# Patient Record
Sex: FEMALE | Race: BLACK OR AFRICAN AMERICAN | Employment: FULL TIME | URBAN - METROPOLITAN AREA
[De-identification: names, ages, dates, MRNs, and addresses within clinical notes are randomized per-mention and may not be internally consistent; named-entity substitution may affect disease eponyms.]

---

## 2017-01-03 ENCOUNTER — APPOINTMENT (OUTPATIENT)
Dept: CT IMAGING | Facility: HOSPITAL | Age: 67
DRG: 021 | End: 2017-01-03
Attending: EMERGENCY MEDICINE
Payer: MEDICAID

## 2017-01-03 ENCOUNTER — ANESTHESIA EVENT (OUTPATIENT)
Dept: INTERVENTIONAL RADIOLOGY/VASCULAR | Facility: HOSPITAL | Age: 67
End: 2017-01-03

## 2017-01-03 ENCOUNTER — APPOINTMENT (OUTPATIENT)
Dept: INTERVENTIONAL RADIOLOGY/VASCULAR | Facility: HOSPITAL | Age: 67
DRG: 021 | End: 2017-01-03
Attending: NURSE PRACTITIONER
Payer: MEDICAID

## 2017-01-03 ENCOUNTER — HOSPITAL ENCOUNTER (INPATIENT)
Facility: HOSPITAL | Age: 67
LOS: 16 days | Discharge: HOME | DRG: 021 | End: 2017-01-19
Attending: EMERGENCY MEDICINE | Admitting: INTERNAL MEDICINE
Payer: MEDICAID

## 2017-01-03 ENCOUNTER — APPOINTMENT (OUTPATIENT)
Dept: CT IMAGING | Facility: HOSPITAL | Age: 67
DRG: 021 | End: 2017-01-03
Attending: PHYSICIAN ASSISTANT
Payer: MEDICAID

## 2017-01-03 ENCOUNTER — ANESTHESIA (OUTPATIENT)
Dept: INTERVENTIONAL RADIOLOGY/VASCULAR | Facility: HOSPITAL | Age: 67
End: 2017-01-03

## 2017-01-03 DIAGNOSIS — I10 ESSENTIAL HYPERTENSION: ICD-10-CM

## 2017-01-03 DIAGNOSIS — I67.1 BRAIN ANEURYSM: ICD-10-CM

## 2017-01-03 DIAGNOSIS — I60.9 SAH (SUBARACHNOID HEMORRHAGE) (HCC): Primary | ICD-10-CM

## 2017-01-03 PROBLEM — R40.20 LOC (LOSS OF CONSCIOUSNESS) (HCC): Status: ACTIVE | Noted: 2017-01-03

## 2017-01-03 PROBLEM — Z91.81 AT RISK FOR FALLING: Status: ACTIVE | Noted: 2017-01-03

## 2017-01-03 LAB
ALBUMIN SERPL-MCNC: 3.7 G/DL (ref 3.5–4.8)
ALP LIVER SERPL-CCNC: 69 U/L (ref 50–130)
ALT SERPL-CCNC: 43 U/L (ref 14–54)
ANTIBODY SCREEN: NEGATIVE
APTT PPP: 26.3 SECONDS (ref 25–34)
AST SERPL-CCNC: 37 U/L (ref 15–41)
ATRIAL RATE: 74 BPM
BASOPHILS # BLD AUTO: 0.05 X10(3) UL (ref 0–0.1)
BASOPHILS NFR BLD AUTO: 0.6 %
BILIRUB SERPL-MCNC: 0.4 MG/DL (ref 0.1–2)
BUN BLD-MCNC: 10 MG/DL (ref 8–20)
BUN BLD-MCNC: 12 MG/DL (ref 8–20)
CALCIUM BLD-MCNC: 7.5 MG/DL (ref 8.3–10.3)
CALCIUM BLD-MCNC: 8.1 MG/DL (ref 8.3–10.3)
CHLORIDE: 107 MMOL/L (ref 101–111)
CHLORIDE: 107 MMOL/L (ref 101–111)
CO2: 24 MMOL/L (ref 22–32)
CO2: 25 MMOL/L (ref 22–32)
CREAT BLD-MCNC: 1.05 MG/DL (ref 0.55–1.02)
CREAT BLD-MCNC: 1.08 MG/DL (ref 0.55–1.02)
EOSINOPHIL # BLD AUTO: 0.07 X10(3) UL (ref 0–0.3)
EOSINOPHIL NFR BLD AUTO: 0.9 %
ERYTHROCYTE [DISTWIDTH] IN BLOOD BY AUTOMATED COUNT: 12.2 % (ref 11.5–16)
EST. AVERAGE GLUCOSE BLD GHB EST-MCNC: 114 MG/DL (ref 68–126)
GLUCOSE BLD-MCNC: 130 MG/DL (ref 65–99)
GLUCOSE BLD-MCNC: 142 MG/DL (ref 70–99)
GLUCOSE BLD-MCNC: 146 MG/DL (ref 70–99)
HAV IGM SER QL: 1.6 MG/DL (ref 1.7–3)
HBA1C MFR BLD HPLC: 5.6 % (ref ?–5.7)
HCT VFR BLD AUTO: 34.9 % (ref 34–50)
HGB BLD-MCNC: 11.5 G/DL (ref 12–16)
IMMATURE GRANULOCYTE COUNT: 0.07 X10(3) UL (ref 0–1)
IMMATURE GRANULOCYTE RATIO %: 0.9 %
INR BLD: 1.05 (ref 0.89–1.12)
ISTAT ACTIVATED CLOTTING TIME: 131 SECONDS (ref 74–137)
ISTAT ACTIVATED CLOTTING TIME: 178 SECONDS (ref 74–137)
LYMPHOCYTES # BLD AUTO: 3.99 X10(3) UL (ref 0.9–4)
LYMPHOCYTES NFR BLD AUTO: 48.5 %
M PROTEIN MFR SERPL ELPH: 7.2 G/DL (ref 6.1–8.3)
MCH RBC QN AUTO: 32.2 PG (ref 27–33.2)
MCHC RBC AUTO-ENTMCNC: 33 G/DL (ref 31–37)
MCV RBC AUTO: 97.8 FL (ref 81–100)
MONOCYTES # BLD AUTO: 0.73 X10(3) UL (ref 0.1–0.6)
MONOCYTES NFR BLD AUTO: 8.9 %
NEUTROPHIL ABS PRELIM: 3.32 X10 (3) UL (ref 1.3–6.7)
NEUTROPHILS # BLD AUTO: 3.32 X10(3) UL (ref 1.3–6.7)
NEUTROPHILS NFR BLD AUTO: 40.2 %
P AXIS: 73 DEGREES
P-R INTERVAL: 144 MS
PLATELET # BLD AUTO: 192 10(3)UL (ref 150–450)
POTASSIUM SERPL-SCNC: 2.9 MMOL/L (ref 3.6–5.1)
POTASSIUM SERPL-SCNC: 4.4 MMOL/L (ref 3.6–5.1)
PSA SERPL DL<=0.01 NG/ML-MCNC: 14 SECONDS (ref 12.3–14.8)
Q-T INTERVAL: 432 MS
QRS DURATION: 84 MS
QTC CALCULATION (BEZET): 479 MS
R AXIS: 40 DEGREES
RBC # BLD AUTO: 3.57 X10(6)UL (ref 3.8–5.1)
RED CELL DISTRIBUTION WIDTH-SD: 44.1 FL (ref 35.1–46.3)
RH BLOOD TYPE: POSITIVE
SODIUM SERPL-SCNC: 139 MMOL/L (ref 136–144)
SODIUM SERPL-SCNC: 140 MMOL/L (ref 136–144)
T AXIS: 7 DEGREES
TROPONIN: <0.046 NG/ML (ref ?–0.05)
VENTRICULAR RATE: 74 BPM
WBC # BLD AUTO: 8.2 X10(3) UL (ref 4–13)

## 2017-01-03 PROCEDURE — 70450 CT HEAD/BRAIN W/O DYE: CPT

## 2017-01-03 PROCEDURE — 99291 CRITICAL CARE FIRST HOUR: CPT | Performed by: OTHER

## 2017-01-03 PROCEDURE — B31QYZZ FLUOROSCOPY OF CERVICO-CEREBRAL ARCH USING OTHER CONTRAST: ICD-10-PCS | Performed by: RADIOLOGY

## 2017-01-03 PROCEDURE — 70498 CT ANGIOGRAPHY NECK: CPT

## 2017-01-03 PROCEDURE — 70496 CT ANGIOGRAPHY HEAD: CPT

## 2017-01-03 PROCEDURE — 99223 1ST HOSP IP/OBS HIGH 75: CPT | Performed by: INTERNAL MEDICINE

## 2017-01-03 PROCEDURE — 03VG3BZ RESTRICTION OF INTRACRANIAL ARTERY WITH BIOACTIVE INTRALUMINAL DEVICE, PERCUTANEOUS APPROACH: ICD-10-PCS | Performed by: RADIOLOGY

## 2017-01-03 RX ORDER — HYDROMORPHONE HYDROCHLORIDE 1 MG/ML
0.4 INJECTION, SOLUTION INTRAMUSCULAR; INTRAVENOUS; SUBCUTANEOUS EVERY 5 MIN PRN
Status: ACTIVE | OUTPATIENT
Start: 2017-01-03 | End: 2017-01-04

## 2017-01-03 RX ORDER — FAMOTIDINE 10 MG/ML
20 INJECTION, SOLUTION INTRAVENOUS DAILY
Status: DISCONTINUED | OUTPATIENT
Start: 2017-01-03 | End: 2017-01-05

## 2017-01-03 RX ORDER — SODIUM CHLORIDE 9 MG/ML
INJECTION, SOLUTION INTRAVENOUS CONTINUOUS
Status: ACTIVE | OUTPATIENT
Start: 2017-01-03 | End: 2017-01-03

## 2017-01-03 RX ORDER — MORPHINE SULFATE 4 MG/ML
4 INJECTION, SOLUTION INTRAMUSCULAR; INTRAVENOUS EVERY 2 HOUR PRN
Status: DISCONTINUED | OUTPATIENT
Start: 2017-01-03 | End: 2017-01-03

## 2017-01-03 RX ORDER — LIDOCAINE HYDROCHLORIDE 10 MG/ML
INJECTION, SOLUTION INFILTRATION; PERINEURAL
Status: COMPLETED
Start: 2017-01-03 | End: 2017-01-03

## 2017-01-03 RX ORDER — MORPHINE SULFATE 2 MG/ML
2 INJECTION, SOLUTION INTRAMUSCULAR; INTRAVENOUS EVERY 2 HOUR PRN
Status: DISCONTINUED | OUTPATIENT
Start: 2017-01-03 | End: 2017-01-19

## 2017-01-03 RX ORDER — MORPHINE SULFATE 2 MG/ML
2 INJECTION, SOLUTION INTRAMUSCULAR; INTRAVENOUS EVERY 2 HOUR PRN
Status: DISCONTINUED | OUTPATIENT
Start: 2017-01-03 | End: 2017-01-03

## 2017-01-03 RX ORDER — ONDANSETRON 2 MG/ML
4 INJECTION INTRAMUSCULAR; INTRAVENOUS AS NEEDED
Status: DISCONTINUED | OUTPATIENT
Start: 2017-01-03 | End: 2017-01-03

## 2017-01-03 RX ORDER — SODIUM CHLORIDE, SODIUM LACTATE, POTASSIUM CHLORIDE, CALCIUM CHLORIDE 600; 310; 30; 20 MG/100ML; MG/100ML; MG/100ML; MG/100ML
INJECTION, SOLUTION INTRAVENOUS CONTINUOUS
Status: DISCONTINUED | OUTPATIENT
Start: 2017-01-03 | End: 2017-01-05

## 2017-01-03 RX ORDER — MORPHINE SULFATE 4 MG/ML
4 INJECTION, SOLUTION INTRAMUSCULAR; INTRAVENOUS EVERY 2 HOUR PRN
Status: DISCONTINUED | OUTPATIENT
Start: 2017-01-03 | End: 2017-01-05

## 2017-01-03 RX ORDER — FAMOTIDINE 20 MG/1
20 TABLET ORAL DAILY
Status: DISCONTINUED | OUTPATIENT
Start: 2017-01-03 | End: 2017-01-19

## 2017-01-03 RX ORDER — LABETALOL HYDROCHLORIDE 5 MG/ML
10 INJECTION, SOLUTION INTRAVENOUS AS NEEDED
Status: DISCONTINUED | OUTPATIENT
Start: 2017-01-03 | End: 2017-01-19

## 2017-01-03 RX ORDER — ONDANSETRON 2 MG/ML
4 INJECTION INTRAMUSCULAR; INTRAVENOUS EVERY 6 HOURS PRN
Status: DISCONTINUED | OUTPATIENT
Start: 2017-01-03 | End: 2017-01-19

## 2017-01-03 RX ORDER — POLYETHYLENE GLYCOL 3350 17 G/17G
17 POWDER, FOR SOLUTION ORAL DAILY PRN
Status: DISCONTINUED | OUTPATIENT
Start: 2017-01-03 | End: 2017-01-19

## 2017-01-03 RX ORDER — ONDANSETRON 2 MG/ML
4 INJECTION INTRAMUSCULAR; INTRAVENOUS ONCE
Status: COMPLETED | OUTPATIENT
Start: 2017-01-03 | End: 2017-01-03

## 2017-01-03 RX ORDER — MEPERIDINE HYDROCHLORIDE 25 MG/ML
12.5 INJECTION INTRAMUSCULAR; INTRAVENOUS; SUBCUTANEOUS AS NEEDED
Status: DISCONTINUED | OUTPATIENT
Start: 2017-01-03 | End: 2017-01-05

## 2017-01-03 RX ORDER — FAMOTIDINE 10 MG/ML
20 INJECTION, SOLUTION INTRAVENOUS DAILY
Status: DISCONTINUED | OUTPATIENT
Start: 2017-01-03 | End: 2017-01-03

## 2017-01-03 RX ORDER — NIMODIPINE 30 MG/1
60 CAPSULE, LIQUID FILLED ORAL
Status: DISCONTINUED | OUTPATIENT
Start: 2017-01-03 | End: 2017-01-11

## 2017-01-03 RX ORDER — NALOXONE HYDROCHLORIDE 0.4 MG/ML
80 INJECTION, SOLUTION INTRAMUSCULAR; INTRAVENOUS; SUBCUTANEOUS AS NEEDED
Status: ACTIVE | OUTPATIENT
Start: 2017-01-03 | End: 2017-01-04

## 2017-01-03 RX ORDER — HEPARIN SODIUM 5000 [USP'U]/ML
INJECTION, SOLUTION INTRAVENOUS; SUBCUTANEOUS
Status: COMPLETED
Start: 2017-01-03 | End: 2017-01-03

## 2017-01-03 RX ORDER — FAMOTIDINE 10 MG/ML
INJECTION, SOLUTION INTRAVENOUS
Status: DISPENSED
Start: 2017-01-03 | End: 2017-01-04

## 2017-01-03 RX ORDER — ONDANSETRON 2 MG/ML
4 INJECTION INTRAMUSCULAR; INTRAVENOUS EVERY 4 HOURS PRN
Status: DISCONTINUED | OUTPATIENT
Start: 2017-01-03 | End: 2017-01-03

## 2017-01-03 RX ORDER — FAMOTIDINE 20 MG/1
20 TABLET ORAL DAILY
Status: DISCONTINUED | OUTPATIENT
Start: 2017-01-03 | End: 2017-01-03

## 2017-01-03 RX ORDER — CEFAZOLIN SODIUM 1 G/3ML
INJECTION, POWDER, FOR SOLUTION INTRAMUSCULAR; INTRAVENOUS
Status: DISCONTINUED | OUTPATIENT
Start: 2017-01-03 | End: 2017-01-04

## 2017-01-03 RX ORDER — MORPHINE SULFATE 4 MG/ML
4 INJECTION, SOLUTION INTRAMUSCULAR; INTRAVENOUS ONCE
Status: COMPLETED | OUTPATIENT
Start: 2017-01-03 | End: 2017-01-03

## 2017-01-03 RX ORDER — SODIUM CHLORIDE 9 MG/ML
INJECTION, SOLUTION INTRAVENOUS CONTINUOUS
Status: DISCONTINUED | OUTPATIENT
Start: 2017-01-03 | End: 2017-01-07

## 2017-01-03 RX ORDER — ONDANSETRON 2 MG/ML
INJECTION INTRAMUSCULAR; INTRAVENOUS
Status: COMPLETED
Start: 2017-01-03 | End: 2017-01-03

## 2017-01-03 RX ORDER — BISACODYL 10 MG
10 SUPPOSITORY, RECTAL RECTAL
Status: DISCONTINUED | OUTPATIENT
Start: 2017-01-03 | End: 2017-01-19

## 2017-01-03 RX ORDER — VERAPAMIL HYDROCHLORIDE 2.5 MG/ML
INJECTION, SOLUTION INTRAVENOUS
Status: COMPLETED
Start: 2017-01-03 | End: 2017-01-03

## 2017-01-03 RX ADMIN — CEFAZOLIN SODIUM 2 G: 1 INJECTION, POWDER, FOR SOLUTION INTRAMUSCULAR; INTRAVENOUS at 17:31:00

## 2017-01-03 NOTE — PROGRESS NOTES
[id number] Ell[facility] Strong Interventional Neuro Radiology Consult    Wayne Dupont Patient Status:  Emergency    1950 MRN [id number]   Location 656 [facility] Attending Jeromy Carreon MD   Hosp Day # 0 PCP PHYSICIAN NONSTAFF Ht 5' 4\" (1.626 m)  Wt 140 lb (63.504 kg)  BMI 24.02 kg/m2  SpO2 100%  GENERAL:  Patient is a 61year old female in no acute distress. HEENT:  Normocephalic, atraumatic  LUNGS: Clear to auscultation bilaterally. HEART:  S1, S2, Regular rate and rhythm. parotid neoplasm. This should be further characterized with dedicated MRI of the soft tissues of the neck with and without contrast.    1/3/2017 CT Brain  Diffuse SAH throughout the basal cisterns in the supratentorial sulci is noted.   Focal collection of

## 2017-01-03 NOTE — ANESTHESIA PREPROCEDURE EVALUATION
PRE-OP EVALUATION    Patient Name: Asim Gamboa    Pre-op Diagnosis: SAH    Cerebral Aneurysm coiling    Pre-op vitals reviewed. Temp: 97.8 °F (36.6 °C)  Pulse: 93  Resp: 14  BP: 139/80 mmHg  SpO2: 100 %    Current medications reviewed.   [facility] reviewed. No pertinent past surgical history. Smoking status: Former Smoker     Smokeless tobacco: Not on file    Alcohol Use: Not on file       Drug Use: Not on file     Available pre-op labs reviewed.     Lab Results  Component Value Date   WBC 8.2 01

## 2017-01-03 NOTE — CONSULTS
[facility]  Neurocritical Care       Name: Lisa Ortega  MRN: [id number]  Admission Date/Time: 1/3/2017  2:21 PM  Primary Care Provider:  PHYSICIAN NONSTAFF        Reason for Consultation:   SAH - Massive Headache, worst headache of life. production  Cardiac:               Denies chest pain, palpitations or lower extremity edema. GI:                         Denies constipation, heartburn or melena. :                       Denies dysuria or hematuria.   Skin:                     Denies ra ideally near 70 and HDL > or around 40.  4. Seizure prophylaxis - Keppra 1000mg IV x 1 then 500mg IV BID and EEG. 5. Hold all antiplatelets and anticoagulants as of now. 6. Repeat CT Brain in am or sooner if patient deteriorates.   7. IV Fluids 0.9 bernabe

## 2017-01-03 NOTE — ED INITIAL ASSESSMENT (HPI)
Pt was at home talking to her friends sitting in the living room and fell out of the chair passed out for about 30 sec and woke up with a headache

## 2017-01-03 NOTE — ED PROVIDER NOTES
Patient Seen in: [facility] Emergency Department    History   Patient presents with:  Syncope (cardiovascular, neurologic)    Stated Complaint: syncope    HPI    62-year-old female presents to the emergency department by ambulance after experiencing signs were reviewed per nurse's notes. Initial blood pressure was 158/92. HEENT: Normocephalic atraumatic. Pupils equal round reactive to light. Extra ocular movements are intact.   Nose and oropharynx are normal.  Faces are symmetrical.  Neck: No adeno ---------                               -----------         ------                     BLOOD TYPE, ABO AND RH [id number]]                         Final result               ANTIBODY CLMBAV[[id number]]                                  Final result thyroid ultrasound.     5. 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  This should be further characterized with dedicated MRI of the soft tissues of the neck wi

## 2017-01-03 NOTE — H&P
[facility] HOSPITALIST  History and Physical     Daisyabraham Gonzalez Patient Status:  Emergency    1950 MRN [id number]   Location 656 [facility] Attending Viv Hackett MD   Hosp Day # 0 PCP PHYSICIAN NONSTAFF     Chief Complaint: distress. Somnolent but arouseable. HEENT: Normocephalic atraumatic. Moist mucous membranes. EOM-I. PERRLA. Anicteric. Neck: No lymphadenopathy. JVD approx 4cm above clavicle HOB at 30 degree. No carotid bruits.   Respiratory: Clear to auscultation bilate bleed  · CODE status: full  · Wayne: present, remove ASAP    Plan of care discussed with patient    Deni Woodruff MD  1/3/2017

## 2017-01-04 ENCOUNTER — APPOINTMENT (OUTPATIENT)
Dept: ULTRASOUND IMAGING | Facility: HOSPITAL | Age: 67
DRG: 021 | End: 2017-01-04
Attending: Other
Payer: MEDICAID

## 2017-01-04 ENCOUNTER — APPOINTMENT (OUTPATIENT)
Dept: CT IMAGING | Facility: HOSPITAL | Age: 67
DRG: 021 | End: 2017-01-04
Attending: Other
Payer: MEDICAID

## 2017-01-04 ENCOUNTER — APPOINTMENT (OUTPATIENT)
Dept: CV DIAGNOSTICS | Facility: HOSPITAL | Age: 67
DRG: 021 | End: 2017-01-04
Attending: Other
Payer: MEDICAID

## 2017-01-04 LAB
ALBUMIN SERPL-MCNC: 3.5 G/DL (ref 3.5–4.8)
ALP LIVER SERPL-CCNC: 67 U/L (ref 50–130)
ALT SERPL-CCNC: 41 U/L (ref 14–54)
AST SERPL-CCNC: 39 U/L (ref 15–41)
BASOPHILS # BLD AUTO: 0.02 X10(3) UL (ref 0–0.1)
BASOPHILS NFR BLD AUTO: 0.2 %
BILIRUB SERPL-MCNC: 0.5 MG/DL (ref 0.1–2)
BUN BLD-MCNC: 8 MG/DL (ref 8–20)
CALCIUM BLD-MCNC: 8.1 MG/DL (ref 8.3–10.3)
CHLORIDE: 104 MMOL/L (ref 101–111)
CHOLEST SMN-MCNC: 194 MG/DL (ref ?–200)
CO2: 25 MMOL/L (ref 22–32)
CREAT BLD-MCNC: 1.12 MG/DL (ref 0.55–1.02)
EOSINOPHIL # BLD AUTO: 0 X10(3) UL (ref 0–0.3)
EOSINOPHIL NFR BLD AUTO: 0 %
ERYTHROCYTE [DISTWIDTH] IN BLOOD BY AUTOMATED COUNT: 12.4 % (ref 11.5–16)
GLUCOSE BLD-MCNC: 115 MG/DL (ref 65–99)
GLUCOSE BLD-MCNC: 123 MG/DL (ref 65–99)
GLUCOSE BLD-MCNC: 124 MG/DL (ref 65–99)
GLUCOSE BLD-MCNC: 139 MG/DL (ref 65–99)
GLUCOSE BLD-MCNC: 148 MG/DL (ref 65–99)
GLUCOSE BLD-MCNC: 160 MG/DL (ref 70–99)
GLUCOSE BLD-MCNC: 273 MG/DL (ref 65–99)
HAV IGM SER QL: 2.4 MG/DL (ref 1.7–3)
HCT VFR BLD AUTO: 36.6 % (ref 34–50)
HDLC SERPL-MCNC: 101 MG/DL (ref 45–?)
HDLC SERPL: 1.92 {RATIO} (ref ?–4.44)
HGB BLD-MCNC: 12.4 G/DL (ref 12–16)
IMMATURE GRANULOCYTE COUNT: 0.04 X10(3) UL (ref 0–1)
IMMATURE GRANULOCYTE RATIO %: 0.4 %
INR BLD: 1.08 (ref 0.89–1.12)
LDLC SERPL CALC-MCNC: 83 MG/DL (ref ?–130)
LYMPHOCYTES # BLD AUTO: 1.09 X10(3) UL (ref 0.9–4)
LYMPHOCYTES NFR BLD AUTO: 10.9 %
M PROTEIN MFR SERPL ELPH: 7.2 G/DL (ref 6.1–8.3)
MCH RBC QN AUTO: 32.3 PG (ref 27–33.2)
MCHC RBC AUTO-ENTMCNC: 33.9 G/DL (ref 31–37)
MCV RBC AUTO: 95.3 FL (ref 81–100)
MONOCYTES # BLD AUTO: 0.38 X10(3) UL (ref 0.1–0.6)
MONOCYTES NFR BLD AUTO: 3.8 %
NEUTROPHIL ABS PRELIM: 8.48 X10 (3) UL (ref 1.3–6.7)
NEUTROPHILS # BLD AUTO: 8.48 X10(3) UL (ref 1.3–6.7)
NEUTROPHILS NFR BLD AUTO: 84.7 %
NONHDLC SERPL-MCNC: 93 MG/DL (ref ?–130)
PLATELET # BLD AUTO: 199 10(3)UL (ref 150–450)
POTASSIUM SERPL-SCNC: 4.4 MMOL/L (ref 3.6–5.1)
PSA SERPL DL<=0.01 NG/ML-MCNC: 14.3 SECONDS (ref 12.3–14.8)
RBC # BLD AUTO: 3.84 X10(6)UL (ref 3.8–5.1)
RED CELL DISTRIBUTION WIDTH-SD: 43.4 FL (ref 35.1–46.3)
SODIUM SERPL-SCNC: 139 MMOL/L (ref 136–144)
TRIGLYCERIDES: 51 MG/DL (ref ?–150)
TROPONIN: <0.046 NG/ML (ref ?–0.05)
VLDL: 10 MG/DL (ref 5–40)
WBC # BLD AUTO: 10 X10(3) UL (ref 4–13)

## 2017-01-04 PROCEDURE — 70450 CT HEAD/BRAIN W/O DYE: CPT

## 2017-01-04 PROCEDURE — 93306 TTE W/DOPPLER COMPLETE: CPT | Performed by: INTERNAL MEDICINE

## 2017-01-04 PROCEDURE — 93886 INTRACRANIAL COMPLETE STUDY: CPT

## 2017-01-04 PROCEDURE — 93306 TTE W/DOPPLER COMPLETE: CPT

## 2017-01-04 PROCEDURE — 99291 CRITICAL CARE FIRST HOUR: CPT | Performed by: OTHER

## 2017-01-04 PROCEDURE — 99232 SBSQ HOSP IP/OBS MODERATE 35: CPT | Performed by: NURSE PRACTITIONER

## 2017-01-04 PROCEDURE — 99233 SBSQ HOSP IP/OBS HIGH 50: CPT | Performed by: INTERNAL MEDICINE

## 2017-01-04 RX ORDER — HYDROCODONE BITARTRATE AND ACETAMINOPHEN 5; 325 MG/1; MG/1
1 TABLET ORAL EVERY 4 HOURS PRN
Status: DISCONTINUED | OUTPATIENT
Start: 2017-01-04 | End: 2017-01-07

## 2017-01-04 RX ORDER — HYDROCHLOROTHIAZIDE 12.5 MG/1
12.5 CAPSULE, GELATIN COATED ORAL EVERY MORNING
Status: ON HOLD | COMMUNITY
End: 2017-01-16

## 2017-01-04 RX ORDER — HYDROCODONE BITARTRATE AND ACETAMINOPHEN 5; 325 MG/1; MG/1
2 TABLET ORAL EVERY 4 HOURS PRN
Status: DISCONTINUED | OUTPATIENT
Start: 2017-01-04 | End: 2017-01-07

## 2017-01-04 RX ORDER — ASPIRIN 81 MG/1
81 TABLET ORAL DAILY
Status: DISCONTINUED | OUTPATIENT
Start: 2017-01-04 | End: 2017-01-19

## 2017-01-04 RX ORDER — HEPARIN SODIUM 5000 [USP'U]/ML
5000 INJECTION, SOLUTION INTRAVENOUS; SUBCUTANEOUS EVERY 12 HOURS
Status: DISCONTINUED | OUTPATIENT
Start: 2017-01-05 | End: 2017-01-09

## 2017-01-04 RX ORDER — ASPIRIN 325 MG
325 TABLET ORAL DAILY
Status: DISCONTINUED | OUTPATIENT
Start: 2017-01-04 | End: 2017-01-04

## 2017-01-04 RX ORDER — ACETAMINOPHEN 325 MG/1
650 TABLET ORAL EVERY 6 HOURS PRN
Status: DISCONTINUED | OUTPATIENT
Start: 2017-01-04 | End: 2017-01-19

## 2017-01-04 NOTE — PROGRESS NOTES
[facility]  Interventional Neuroradiology Progress Note    Trevor Thao Patient Status:  Inpatient    1950 MRN [id number]   [facility] 6NE-A Attending Dionte Stiles MD   Hosp Day # 1 PCP PHYSICIAN NONSTAFF         Subjective: 1.12 01/04/2017   BUN 8 01/04/2017    01/04/2017   K 4.4 01/04/2017    01/04/2017   CO2 25.0 01/04/2017    01/04/2017   CA 8.1 01/04/2017   ALB 3.5 01/04/2017   ALKPHO 67 01/04/2017   BILT 0.5 01/04/2017   TP 7.2 01/04/2017   AST 39 01/0 20 mg Oral Daily   • levETIRAcetam (KEPPRA) IVPB  500 mg Intravenous Q12H   • famoTIDine  20 mg Intravenous Daily   • insulin aspart  1-5 Units Subcutaneous TID CC and HS   • nimodipine  60 mg Oral 6 times per day       Assessment/Plan:    1.  ANNETTA (1/3/17)

## 2017-01-04 NOTE — PAYOR COMM NOTE
Attending Physician: Ted Yuan MD    Review Type: ADMISSION   Reviewer: Catalino Crane       Date: January 4, 2017 - 9:39 AM  Payor: Sergio Barrientos  Authorization Number: N/A  Admit date: 1/3/2017  2:21 PM   Admitted from Emergency Dept.: yes    Desiree Carrera is likely the source of acute subarachnoid hemorrhage.   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     4. Heterogenous thyroid gland would be better evaluated with dedicated thyroid ultrasound.     5. Enhancing lesion in Route User    1/4/2017 0547 Given 2 mg Intravenous Saba Cervantes RN    1/3/2017 2038 Given 2 mg Intravenous Saba Cervantes RN      morphINE sulfate (PF) 4 MG/ML injection 4 mg     Date Action Dose Route User    1/3/2017 1604 Given 4 mg Intravenous M limits   COMP METABOLIC PANEL (14) - Abnormal; Notable for the following:     Glucose 160 (*)     Creatinine 1.12 (*)     Calcium, Total 8.1 (*)     All other components within normal limits   POCT ISTAT ACTIVATED CLOTTING TIME - Abnormal; Notable for the the individual orders. CBC WITH DIFFERENTIAL WITH PLATELET    Narrative: The following orders were created for panel order CBC WITH DIFFERENTIAL WITH PLATELET.   Procedure                               Abnormality         Status                     -- Give Labetelol 10mg IV X 1 if required. 3. Continue Statin, goal Keep LDL < 100 ideally near 70 and HDL > or around 40.  4. Seizure prophylaxis - Keppra 1000mg IV x 1 then 500mg IV BID and EEG.   5. Hold all antiplatelets and anticoagulants as of now.    6

## 2017-01-04 NOTE — PLAN OF CARE
Problem: NEUROLOGICAL - ADULT  Goal: Achieves stable or improved neurological status  INTERVENTIONS  - Assess for and report changes in neurological status  - Initiate measures to prevent increased intracranial pressure  - Maintain blood pressure and fluid Cardene gtt on and off through the night to keep ABP/SBP less than 140. Repeat CT head done as ordered post procedure. Results relayed to Dr. Shant Fagan with no orders made. Made comfortable. Will continue to monitor closely.

## 2017-01-04 NOTE — ANESTHESIA POSTPROCEDURE EVALUATION
Rekha 174 Patient Status:  Emergency   Age/Gender 61year old female MRN [id number]   Location 656 [facility] Attending [address] Day # 0 PCP PHYSICIAN NONSTAFF       Anesthesia Post-op Note

## 2017-01-04 NOTE — OCCUPATIONAL THERAPY NOTE
OT order received. Attempted to see pt for eval, but pt is still on bedrest at this time. Will hold off until upgraded activity orders are present. RN in agreement with this plan.

## 2017-01-04 NOTE — PHYSICAL THERAPY NOTE
Order received for PT eval. Attempted to see Pt this am, however, Pt currently on bedrest and not appropriate for therapy at this time. Will follow up later, as schedule permits.      Followed up later this morning and RN reports patient on bedrest for jose luis

## 2017-01-04 NOTE — PROCEDURES
[facility]  Neurointerventional Surgery Operative Report  Stephanie Patterson Patient Status:  Emergency    1950 MRN [id number]   Location 656 [facility] Attending Ana Laura Valentino MD   Hosp Day # 0 PCP PHYSICIAN NONSTAFF

## 2017-01-04 NOTE — PROGRESS NOTES
Neurosurgery Progress Note     SUBJECTIVE:  Interval History: stable.      OBJECTIVE:  Vital signs   Temp:  [97.8 °F (36.6 °C)-99.4 °F (37.4 °C)] 99.4 °F (37.4 °C)  Pulse:  [71-96] 71  Resp:  [10-22] 12  BP: (101-165)/(61-98) 115/73 mmHg  Arterial Line BP:

## 2017-01-04 NOTE — PROGRESS NOTES
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     Review of Systems    Constitutional:    Denies b/l No pronator drift or tremor. Sensory: Normal and symmetric to light touch.    Cerebellar: Finger-nose-finger intact b/l.   Gait: Deferred          Diagnostics:   Ct Brain Or Head (71773)    1/4/2017  PROCEDURE:  CT OF THE BRAIN WITHOUT CONTRAST  HUNG region of hemorrhage in the left frontal high convexity sulcus. This measures approximately 1.3 x 1.0 cm. .  Low density throughout the white matter is noted. This likely is due to chronic small vessel ischemic disease.  However, possibility of infarcts carmencita Neck (w Iv)(cpt=70496/52101)    1/3/2017  PROCEDURE:  CT STROKE BRAIN (NO IV) + CTA BRAIN/CTA NECK (W IV)(CPT=70496/99496)  COMPARISON:  [facility] , CT BRAIN OR HEAD (28161), 1/03/2017, 14:45.   INDICATIONS:  syncope  TECHNIQUE  Noncontrast CT scanning is perf carotid arteries are otherwise unremarkable. An anterior communicating artery is seen. The branches of the anterior cerebral and middle cerebral arteries are unremarkable.   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  This should be further characterized with dedicated MRI of the soft tissues of the neck with and without contrast.  Critical re norepinephrine (LEVOPHED) 4 mg/250 ml premix infusion 0.5-30 mcg/min Intravenous Continuous PRN   PEG 3350 (MIRALAX) powder packet 17 g 17 g Oral Daily PRN   bisacodyl (DULCOLAX) rectal suppository 10 mg 10 mg Rectal Daily PRN   ondansetron HCl (ZOFRAN) discussions with the patient, family, and clinical staff. Thank you for allowing me to participate in the care of this patient.      Jennifer Greene MD  Medical Director - [phone] [facility]  Board Certified in Neurology, V

## 2017-01-04 NOTE — PLAN OF CARE
Assumed patient care this am. Patient alert, oriented x4. Neurologically intact. See neuro flowsheet for details. Patient c/o headache. Pain meds given as ordered. Zofran given prn nausea, no emesis. Will continue to monitor closely.

## 2017-01-04 NOTE — PROCEDURES
[facility]  Pre-Procedure Note  Lisa Clap Patient Status:  Emergency    1950 MRN [id number]   Location 656 [facility] Attending Peggy Rueda MD   Hosp Day # 0 PCP PHYSICIAN NONSTAFF     Pre-Op Diagnosis:  Aneurys

## 2017-01-04 NOTE — PROGRESS NOTES
[facility]PITAL[facility]  Progress Note     Oralia Ridley Patient Status:  Inpatient    1950 MRN [id number]   [facility] 6NE-A Attending Thong Millan MD   [address] Day # 1 PCP PHYSICIAN NONSTAFF     Chief Complaint: syncope    S: Patient unde reviewed in Epic.     Medications:   • famoTIDine  20 mg Oral Daily   • levETIRAcetam (KEPPRA) IVPB  500 mg Intravenous Q12H   • famoTIDine  20 mg Intravenous Daily   • insulin aspart  1-5 Units Subcutaneous TID CC and HS   • nimodipine  60 mg Oral 6 times

## 2017-01-04 NOTE — CONSULTS
[facility]    PATIENT'S NAME: [email or web address]   ATTENDING PHYSICIAN: Jose Enrique Garcia MD   CONSULTING PHYSICIAN: Jae Rob M.D.    PATIENT ACCOUNT#:   [de-identified]    LOCATION:  [facility] [id number] [facility]  MEDICAL RECORD #:   [id number]       DATE OF BIRTH:  [birth year]

## 2017-01-05 ENCOUNTER — APPOINTMENT (OUTPATIENT)
Dept: ULTRASOUND IMAGING | Facility: HOSPITAL | Age: 67
DRG: 021 | End: 2017-01-05
Attending: Other
Payer: MEDICAID

## 2017-01-05 LAB
BUN BLD-MCNC: 12 MG/DL (ref 8–20)
CALCIUM BLD-MCNC: 8.8 MG/DL (ref 8.3–10.3)
CHLORIDE: 103 MMOL/L (ref 101–111)
CO2: 26 MMOL/L (ref 22–32)
CREAT BLD-MCNC: 0.84 MG/DL (ref 0.55–1.02)
GLUCOSE BLD-MCNC: 111 MG/DL (ref 65–99)
GLUCOSE BLD-MCNC: 113 MG/DL (ref 65–99)
GLUCOSE BLD-MCNC: 115 MG/DL (ref 70–99)
GLUCOSE BLD-MCNC: 117 MG/DL (ref 65–99)
GLUCOSE BLD-MCNC: 159 MG/DL (ref 65–99)
HAV IGM SER QL: 2 MG/DL (ref 1.7–3)
POTASSIUM SERPL-SCNC: 4.2 MMOL/L (ref 3.6–5.1)
SODIUM SERPL-SCNC: 135 MMOL/L (ref 136–144)

## 2017-01-05 PROCEDURE — 99291 CRITICAL CARE FIRST HOUR: CPT | Performed by: OTHER

## 2017-01-05 PROCEDURE — 99233 SBSQ HOSP IP/OBS HIGH 50: CPT | Performed by: INTERNAL MEDICINE

## 2017-01-05 PROCEDURE — 93886 INTRACRANIAL COMPLETE STUDY: CPT

## 2017-01-05 PROCEDURE — 99232 SBSQ HOSP IP/OBS MODERATE 35: CPT | Performed by: NURSE PRACTITIONER

## 2017-01-05 RX ORDER — LEVETIRACETAM 500 MG/1
500 TABLET ORAL 2 TIMES DAILY
Status: DISCONTINUED | OUTPATIENT
Start: 2017-01-05 | End: 2017-01-09

## 2017-01-05 NOTE — PROGRESS NOTES
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 1/5/2017- Headaches still there.     Review of Sy Motor: Tone normal and symmetric. 5/5 b/l No pronator drift or tremor. Sensory: Normal and symmetric to light touch.    Cerebellar: Finger-nose-finger intact b/l.   Gait: Deferred          Diagnostics:   Ct Brain Or Head (75798)    1/4/2017  PROCEDURE parenchyma is noted. There is a localized focal region of hemorrhage in the left frontal high convexity sulcus. This measures approximately 1.3 x 1.0 cm. .  Low density throughout the white matter is noted.  This likely is due to chronic small vessel ischemi Ct Stroke Brain (no Iv) + Cta Brain/cta Neck (w Iv)(cpt=70496/44310)    1/3/2017  PROCEDURE:  CT STROKE BRAIN (NO IV) + CTA BRAIN/CTA NECK (W IV)(CPT=70496/77478)  COMPARISON:  [facility] , CT BRAIN OR HEAD (56170), 1/03/2017, 14:45.   INDICATIONS:  syncop petrous, cavernous, and supraclinoid internal carotid arteries are otherwise unremarkable. An anterior communicating artery is seen. The branches of the anterior cerebral and middle cerebral arteries are unremarkable.   A small right  posterior communicat dedicated thyroid ultrasound. 5. 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  This should be further characterized with dedicated MRI of the soft tissues of the flexpen 1-5 Units 1-5 Units Subcutaneous TID CC and HS   nimodipine (NIMOTOP) cap 60 mg 60 mg Oral 6 times per day   Meperidine HCl (DEMEROL) 25 MG/ML injection 12.5 mg 12.5 mg Intravenous PRN       Assesment/Plan:   Principal Problem:    SAH (subarachnoid

## 2017-01-05 NOTE — PROGRESS NOTES
[facility]  Interventional Neuroradiology Progress Note    Wayne Castroins Patient Status:  Inpatient    1950 MRN [id number]   [facility] 6NE-A Attending Karuna Panchal MD   [facility] Day # 2 PCP PHYSICIAN NONSTAFF         Subjective: Results  Component Value Date   [facility]       Imaging:    FINDINGS:  This is a limited exam again due to difficulty penetrating both temporal windows.  No velocities could be obtained for the bilateral middle cerebral arteries, anterior cerebral study, no reported seizures, she remains neurologically intact with her only complaints of a headache and neck pain/stiffness  Cont asa 81 mg daily     HCT ordered for tomorrow per neurosurgery      Will update Dr Kimber Gaming with above.       Lisa Dates,

## 2017-01-05 NOTE — PHYSICAL THERAPY NOTE
PHYSICAL THERAPY EVALUATION - INPATIENT     Room Number: 3764/6000-U  Evaluation Date: 1/5/2017  Type of Evaluation: Initial  Physician Order: PT Eval and Treat    Presenting Problem: SAH.  QUINN aneursym s/p coiling 1/3/17  Reason for Therapy: Mobility strength is within functional limits     BALANCE  Static Sitting: Good  Dynamic Sitting: Good  Static Standing: Fair  Dynamic Standing: Fair -    ADDITIONAL TESTS  Additional Tests: Modified [place]              Modified Lawton: 4                  Marlena Lozano nausea. Pt sit-stand sans AD with supervision. Pt ambulated 5ft sans AD with supervision to bedside chair. Pt ambulates with steady gait pattern. BP within parameters in chair and nausea subsiding. Pt educated on POC and recommendations.  Pt left with call ascend/descend 1 flight of stairs with MOD I.     Goal #5    Goal #6    Goal Comments: Goals established on 1/5/2017

## 2017-01-05 NOTE — PLAN OF CARE
Problem: Impaired Activities of Daily Living  Goal: Achieve highest/safest level of independence in self care  Interventions:  - Assess ability and encourage patient to participate in ADLs to maximize function  - Promote sitting position while performing A

## 2017-01-05 NOTE — PROGRESS NOTES
Neurosurgery Progress Note     SUBJECTIVE:  Interval History: stable.      OBJECTIVE:  Vital signs   Temp:  [98.7 °F (37.1 °C)-100.3 °F (37.9 °C)] 98.8 °F (37.1 °C)  Pulse:  [68-86] 70  Resp:  [11-19] 16  BP: (105-139)/(67-86) 122/79 mmHg  Arterial Line BP:

## 2017-01-05 NOTE — OCCUPATIONAL THERAPY NOTE
OCCUPATIONAL THERAPY EVALUATION - INPATIENT     Room Number: 6216/3368-O  Evaluation Date: 1/5/2017  Type of Evaluation: Initial  Presenting Problem: [facility]    Physician Order: IP Consult to Occupational Therapy  Reason for Therapy: ADL/IADL Dysfunction and D communicate all basic needs and wants    Behavioral/Emotional/Social: Pt was participated in and was motivated for therapy today.     RANGE OF MOTION AND STRENGTH ASSESSMENT  Upper extremity ROM is within functional limits     Upper extremity strength is wi performing mobility and self-care. The patient is below baseline and would benefit from skilled inpatient OT to address the above deficits, maximizing patient's ability to return to prior level of function.     OT Discharge Recommendations: Home  OT Devic

## 2017-01-05 NOTE — PLAN OF CARE
NEUROLOGICAL - ADULT    • Achieves stable or improved neurological status Progressing    • Absence of seizures Progressing        Asumed care last night at 1930 with patient neurologically stable- alert/ drowsy at times but easily aroused. Oriented x4.  Khalif Cervantes

## 2017-01-05 NOTE — PROGRESS NOTES
[facility]  Progress Note     Fabricio Alyamanda Patient Status:  Inpatient    1950 MRN [id number]   [facility] 6NE-A Attending Karuna Parrish MD   Hosp Day # 2 PCP PHYSICIAN NONSTAFF     Chief Complaint: syncope    S: Patient with • aspirin  81 mg Oral Daily   • Heparin Sodium (Porcine)  5,000 Units Subcutaneous Q12H   • famoTIDine  20 mg Oral Daily   • famoTIDine  20 mg Intravenous Daily   • insulin aspart  1-5 Units Subcutaneous TID CC and HS   • nimodipine  60 mg Oral 6 times p

## 2017-01-06 ENCOUNTER — APPOINTMENT (OUTPATIENT)
Dept: CT IMAGING | Facility: HOSPITAL | Age: 67
DRG: 021 | End: 2017-01-06
Attending: NEUROLOGICAL SURGERY
Payer: MEDICAID

## 2017-01-06 ENCOUNTER — APPOINTMENT (OUTPATIENT)
Dept: ULTRASOUND IMAGING | Facility: HOSPITAL | Age: 67
DRG: 021 | End: 2017-01-06
Attending: Other
Payer: MEDICAID

## 2017-01-06 LAB
BUN BLD-MCNC: 11 MG/DL (ref 8–20)
CALCIUM BLD-MCNC: 8.3 MG/DL (ref 8.3–10.3)
CHLORIDE: 95 MMOL/L (ref 101–111)
CO2: 26 MMOL/L (ref 22–32)
CREAT BLD-MCNC: 0.76 MG/DL (ref 0.55–1.02)
ERYTHROCYTE [DISTWIDTH] IN BLOOD BY AUTOMATED COUNT: 11.9 % (ref 11.5–16)
GLUCOSE BLD-MCNC: 113 MG/DL (ref 65–99)
GLUCOSE BLD-MCNC: 113 MG/DL (ref 65–99)
GLUCOSE BLD-MCNC: 125 MG/DL (ref 70–99)
GLUCOSE BLD-MCNC: 131 MG/DL (ref 65–99)
GLUCOSE BLD-MCNC: 98 MG/DL (ref 65–99)
GLUCOSE BLD-MCNC: 98 MG/DL (ref 65–99)
HCT VFR BLD AUTO: 37.6 % (ref 34–50)
HGB BLD-MCNC: 13.2 G/DL (ref 12–16)
MCH RBC QN AUTO: 32.6 PG (ref 27–33.2)
MCHC RBC AUTO-ENTMCNC: 35.1 G/DL (ref 31–37)
MCV RBC AUTO: 92.8 FL (ref 81–100)
OSMOLALITY URINE: 545 MOSM/KG (ref 300–1300)
PLATELET # BLD AUTO: 199 10(3)UL (ref 150–450)
POTASSIUM SERPL-SCNC: 3.7 MMOL/L (ref 3.6–5.1)
RBC # BLD AUTO: 4.05 X10(6)UL (ref 3.8–5.1)
RED CELL DISTRIBUTION WIDTH-SD: 40.3 FL (ref 35.1–46.3)
SODIUM SERPL-SCNC: 128 MMOL/L (ref 136–144)
SODIUM SERPL-SCNC: 132 MMOL/L (ref 136–144)
SODIUM SERPL-SCNC: 135 MMOL/L (ref 136–144)
SODIUM SERPL-SCNC: 161 MMOL/L
WBC # BLD AUTO: 13.2 X10(3) UL (ref 4–13)

## 2017-01-06 PROCEDURE — 99233 SBSQ HOSP IP/OBS HIGH 50: CPT | Performed by: INTERNAL MEDICINE

## 2017-01-06 PROCEDURE — 02HV33Z INSERTION OF INFUSION DEVICE INTO SUPERIOR VENA CAVA, PERCUTANEOUS APPROACH: ICD-10-PCS | Performed by: INTERNAL MEDICINE

## 2017-01-06 PROCEDURE — 93886 INTRACRANIAL COMPLETE STUDY: CPT

## 2017-01-06 PROCEDURE — 70450 CT HEAD/BRAIN W/O DYE: CPT

## 2017-01-06 PROCEDURE — B548ZZA ULTRASONOGRAPHY OF SUPERIOR VENA CAVA, GUIDANCE: ICD-10-PCS | Performed by: INTERNAL MEDICINE

## 2017-01-06 PROCEDURE — 99291 CRITICAL CARE FIRST HOUR: CPT | Performed by: OTHER

## 2017-01-06 PROCEDURE — 99232 SBSQ HOSP IP/OBS MODERATE 35: CPT | Performed by: NURSE PRACTITIONER

## 2017-01-06 RX ORDER — METOCLOPRAMIDE HYDROCHLORIDE 5 MG/ML
5 INJECTION INTRAMUSCULAR; INTRAVENOUS EVERY 6 HOURS PRN
Status: DISCONTINUED | OUTPATIENT
Start: 2017-01-06 | End: 2017-01-19

## 2017-01-06 RX ORDER — SODIUM CHLORIDE 1000 MG
1 TABLET, SOLUBLE MISCELLANEOUS
Status: DISCONTINUED | OUTPATIENT
Start: 2017-01-06 | End: 2017-01-08

## 2017-01-06 RX ORDER — METOCLOPRAMIDE HYDROCHLORIDE 5 MG/ML
INJECTION INTRAMUSCULAR; INTRAVENOUS
Status: COMPLETED
Start: 2017-01-06 | End: 2017-01-06

## 2017-01-06 RX ORDER — 3% SODIUM CHLORIDE 3 G/100ML
INJECTION, SOLUTION INTRAVENOUS CONTINUOUS
Status: DISCONTINUED | OUTPATIENT
Start: 2017-01-06 | End: 2017-01-07

## 2017-01-06 RX ORDER — SODIUM CHLORIDE 0.9 % (FLUSH) 0.9 %
10 SYRINGE (ML) INJECTION AS NEEDED
Status: DISCONTINUED | OUTPATIENT
Start: 2017-01-06 | End: 2017-01-19

## 2017-01-06 RX ORDER — METOCLOPRAMIDE HYDROCHLORIDE 5 MG/ML
5 INJECTION INTRAMUSCULAR; INTRAVENOUS ONCE
Status: DISCONTINUED | OUTPATIENT
Start: 2017-01-06 | End: 2017-01-08 | Stop reason: ALTCHOICE

## 2017-01-06 RX ORDER — POTASSIUM CHLORIDE 14.9 MG/ML
20 INJECTION INTRAVENOUS ONCE
Status: COMPLETED | OUTPATIENT
Start: 2017-01-06 | End: 2017-01-06

## 2017-01-06 NOTE — PLAN OF CARE
Problem: NEUROLOGICAL - ADULT  Goal: Achieves stable or improved neurological status  INTERVENTIONS  - Assess for and report changes in neurological status  - Initiate measures to prevent increased intracranial pressure  - Maintain blood pressure and fluid

## 2017-01-06 NOTE — PROGRESS NOTES
[facility]  Interventional Neuroradiology Progress Note    Phyllis Beavers Patient Status:  Inpatient    1950 MRN [id number]   [facility] 6NE-A Attending Maribel Wolff MD   [facility] Day # 3 PCP PHYSICIAN NONSTAFF         Subjective: Sensory: Intact to light touch  Coordination: FTN  intact  Gait: deferred      Labs:    Lab Results  Component Value Date   WBC 13.2 01/06/2017   HGB 13.2 01/06/2017   HCT 37.6 01/06/2017   .0 01/06/2017   CREATSERUM 0.76 01/06/2017   BUN 11 01/06 1/3/17  1. HCT 1/4 stable with coils in place  2. Cont. asa 81 mg today    3. Headache with neck stiffness/pain secondary to # 1    1. Pain management    4. Hyponatremia - Na level 128 today  1. To receive 3% hypertonic NACL   2.  Salt tablets 1 gram TID sta

## 2017-01-06 NOTE — PHYSICAL THERAPY NOTE
PHYSICAL THERAPY TREATMENT NOTE - INPATIENT    Room Number: 4513/0857-D     Session: 1   Number of Visits to Meet Established Goals: 3    Presenting Problem: SAH.  QUINN aneursym s/p coiling 1/3/17    Problem List  Principal Problem:    SAH (subarachnoid he railing?: A Little    AM-PAC Score:  Raw Score: 20   PT Approx Degree of Impairment Score: 35.83%   Standardized Score (AM-PAC Scale): 47.67   CMS Modifier (G-Code): CJ    FUNCTIONAL ABILITY STATUS  Gait Assessment   Gait Assistance: Minimum assistance  Marion Barbour training;Transfer training        CURRENT GOALS      Goal #1  Patient is able to demonstrate supine - sit EOB @ level: modified independent- MET      Goal #2  Patient is able to demonstrate transfers EOB to/from [facility] at assistance level: modified independen

## 2017-01-06 NOTE — PROGRESS NOTES
[facility]  Progress Note     Renaldo Roy Patient Status:  Inpatient    1950 MRN [id number]   [facility] [place] 6NE-A Attending Jay Coley MD   [facility] Day # 3 PCP PHYSICIAN NONSTAFF     Chief Complaint: syncope    S: Patient with PTP  14.0  14.3   INR  1.05  1.08       Recent Labs   Lab  01/03/17   1432  01/04/17   0438   TROP  <0.046  <0.046            Imaging: Imaging data reviewed in Epic.     Medications:   • sodium chloride  1 g Oral TID CC   • Metoclopramide HCl  5 mg John Acostax

## 2017-01-06 NOTE — PROGRESS NOTES
[facility] Note: Route Optimization for Famotidine (PEPCID)    Patient is currently on Famotidine (PEPCID) 20 mg IV or PO daily.    The patient meets the criteria to convert to the oral equivalent as established by the IV to Oral conversion protocol appro

## 2017-01-06 NOTE — PROGRESS NOTES
Neurosurgery Progress Note     SUBJECTIVE:  Interval History: stable.      OBJECTIVE:  Vital signs   Temp:  [98.4 °F (36.9 °C)-100 °F (37.8 °C)] 98.4 °F (36.9 °C)  Pulse:  [65-91] 67  Resp:  [10-23] 13  BP: (122-166)/(71-97) 122/77 mmHg      Intake/Output

## 2017-01-06 NOTE — OCCUPATIONAL THERAPY NOTE
OCCUPATIONAL THERAPY TREATMENT NOTE - INPATIENT     Room Number: 1797/6443-E  Session: 1   Number of Visits to Meet Established Goals: 5    Presenting Problem: SAH    History related to current admission: Pt is a 61 y.o.  Female admitted 1/3/17 with HA and Scale): 38.66  CMS Modifier (G-Code): CK    FUNCTIONAL TRANSFER ASSESSMENT  Supine to Sit : Supervision  Sit to Stand: Minimum assistance    Skilled Therapy Provided: Pt received up in the room with RN.   Pt completed UE dressing prior to amb in the room an perform all ADLs: independently    Functional Transfer Goals  Patient will perform all functional transfers:  independently    Additional Goals:  Pt will verbalize at least 3 energy conservation techniques  Pt will stand at sink for 5 minutes to complete g

## 2017-01-06 NOTE — PLAN OF CARE
Pt. Ambulating halls, pain is controlled, vitals stable, 3% NACL started, serum sodium q6, will continue to monitor.

## 2017-01-06 NOTE — PROGRESS NOTES
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 1/5/2017- Headaches still there.   1/6/2016 - Virl Christ asymmetry. # 8 Hearing normal.    # 9 & 10: Soft palate elevates normally. # 11: Shoulder shrug is normal and symmetrical.    #12:Tongue is midline. Power of tongue normal.          Motor: Tone normal and symmetric. 5/5 b/l No pronator drift or tremor. grabbed her head and then fell out of chair. Patient had 30 seconds of LOC. Evaluate for bleed. FINDINGS:   Diffuse subarachnoid hemorrhage throughout the brain parenchyma is noted.  There is a localized focal region of hemorrhage in the left fr velocity. 1/4/2017  CONCLUSION:  Very limited data obtained. Please see above.     Dictated by: Neymar Bucio MD on 1/04/2017 at 7:56     Approved by: Neymar Bucio MD            Ct Stroke Brain (no Iv) + Cta Brain/cta Neck (w Iv)(cpt=70496/31892) segment of the right internal carotid artery is the likely source of acute subarachnoid hemorrhage. This aneurysm measures up to 1.4 cm in greatest dimension.   The upper cervical, petrous, cavernous, and supraclinoid internal carotid arteries are otherwise carotid stenosis by NASCET criteria. 3. 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 4. Heterogenous thyroid gland would be better evaluated with dedicated thyroid ultrasound.   5. Enhancing lesion in the left parotid tail (TRANDATE) injection 10 mg 10 mg Intravenous PRN   norepinephrine (LEVOPHED) 4 mg/250 ml premix infusion 0.5-30 mcg/min Intravenous Continuous PRN   PEG 3350 (MIRALAX) powder packet 17 g 17 g Oral Daily PRN   bisacodyl (DULCOLAX) rectal suppository 10 mg 1 clinical staff. Thank you for allowing me to participate in the care of this patient.      Zac Yates MD  Medical Director - [id number] [facility]  Board Certified in Neurology, Vascular Neurology(Stroke), Neurocritical C

## 2017-01-07 ENCOUNTER — APPOINTMENT (OUTPATIENT)
Dept: ULTRASOUND IMAGING | Facility: HOSPITAL | Age: 67
DRG: 021 | End: 2017-01-07
Attending: Other
Payer: MEDICAID

## 2017-01-07 LAB
ALBUMIN SERPL-MCNC: 3.3 G/DL (ref 3.5–4.8)
ALP LIVER SERPL-CCNC: 53 U/L (ref 50–130)
ALT SERPL-CCNC: 38 U/L (ref 14–54)
AST SERPL-CCNC: 30 U/L (ref 15–41)
BASOPHILS # BLD AUTO: 0.03 X10(3) UL (ref 0–0.1)
BASOPHILS NFR BLD AUTO: 0.4 %
BILIRUB SERPL-MCNC: 0.8 MG/DL (ref 0.1–2)
BUN BLD-MCNC: 10 MG/DL (ref 8–20)
CALCIUM BLD-MCNC: 8.1 MG/DL (ref 8.3–10.3)
CHLORIDE: 104 MMOL/L (ref 101–111)
CO2: 24 MMOL/L (ref 22–32)
CREAT BLD-MCNC: 0.78 MG/DL (ref 0.55–1.02)
EOSINOPHIL # BLD AUTO: 0 X10(3) UL (ref 0–0.3)
EOSINOPHIL NFR BLD AUTO: 0 %
ERYTHROCYTE [DISTWIDTH] IN BLOOD BY AUTOMATED COUNT: 12.1 % (ref 11.5–16)
GLUCOSE BLD-MCNC: 105 MG/DL (ref 65–99)
GLUCOSE BLD-MCNC: 107 MG/DL (ref 65–99)
GLUCOSE BLD-MCNC: 118 MG/DL (ref 65–99)
GLUCOSE BLD-MCNC: 122 MG/DL (ref 65–99)
GLUCOSE BLD-MCNC: 127 MG/DL (ref 70–99)
HCT VFR BLD AUTO: 30.6 % (ref 34–50)
HGB BLD-MCNC: 10.3 G/DL (ref 12–16)
IMMATURE GRANULOCYTE COUNT: 0.02 X10(3) UL (ref 0–1)
IMMATURE GRANULOCYTE RATIO %: 0.3 %
LYMPHOCYTES # BLD AUTO: 1.27 X10(3) UL (ref 0.9–4)
LYMPHOCYTES NFR BLD AUTO: 17.4 %
M PROTEIN MFR SERPL ELPH: 7.1 G/DL (ref 6.1–8.3)
MCH RBC QN AUTO: 32.1 PG (ref 27–33.2)
MCHC RBC AUTO-ENTMCNC: 33.7 G/DL (ref 31–37)
MCV RBC AUTO: 95.3 FL (ref 81–100)
MONOCYTES # BLD AUTO: 0.79 X10(3) UL (ref 0.1–0.6)
MONOCYTES NFR BLD AUTO: 10.8 %
NEUTROPHIL ABS PRELIM: 5.2 X10 (3) UL (ref 1.3–6.7)
NEUTROPHILS # BLD AUTO: 5.2 X10(3) UL (ref 1.3–6.7)
NEUTROPHILS NFR BLD AUTO: 71.1 %
PLATELET # BLD AUTO: 160 10(3)UL (ref 150–450)
POTASSIUM SERPL-SCNC: 3.4 MMOL/L (ref 3.6–5.1)
POTASSIUM SERPL-SCNC: 3.8 MMOL/L (ref 3.6–5.1)
RBC # BLD AUTO: 3.21 X10(6)UL (ref 3.8–5.1)
RED CELL DISTRIBUTION WIDTH-SD: 42.6 FL (ref 35.1–46.3)
SODIUM SERPL-SCNC: 138 MMOL/L (ref 136–144)
SODIUM SERPL-SCNC: 141 MMOL/L (ref 136–144)
SODIUM SERPL-SCNC: 143 MMOL/L (ref 136–144)
WBC # BLD AUTO: 7.3 X10(3) UL (ref 4–13)

## 2017-01-07 PROCEDURE — 99233 SBSQ HOSP IP/OBS HIGH 50: CPT | Performed by: INTERNAL MEDICINE

## 2017-01-07 PROCEDURE — 99233 SBSQ HOSP IP/OBS HIGH 50: CPT | Performed by: OTHER

## 2017-01-07 PROCEDURE — 93886 INTRACRANIAL COMPLETE STUDY: CPT

## 2017-01-07 RX ORDER — SODIUM CHLORIDE AND POTASSIUM CHLORIDE .9; .15 G/100ML; G/100ML
SOLUTION INTRAVENOUS CONTINUOUS
Status: DISCONTINUED | OUTPATIENT
Start: 2017-01-07 | End: 2017-01-08

## 2017-01-07 RX ORDER — POTASSIUM CHLORIDE 29.8 MG/ML
40 INJECTION INTRAVENOUS ONCE
Status: COMPLETED | OUTPATIENT
Start: 2017-01-07 | End: 2017-01-07

## 2017-01-07 RX ORDER — HYDROCODONE BITARTRATE AND ACETAMINOPHEN 10; 325 MG/1; MG/1
1 TABLET ORAL EVERY 6 HOURS PRN
Status: DISCONTINUED | OUTPATIENT
Start: 2017-01-07 | End: 2017-01-19

## 2017-01-07 RX ORDER — 3% SODIUM CHLORIDE 3 G/100ML
INJECTION, SOLUTION INTRAVENOUS CONTINUOUS
Status: DISCONTINUED | OUTPATIENT
Start: 2017-01-07 | End: 2017-01-08

## 2017-01-07 RX ORDER — HYDROCODONE BITARTRATE AND ACETAMINOPHEN 10; 325 MG/1; MG/1
2 TABLET ORAL EVERY 6 HOURS PRN
Status: DISCONTINUED | OUTPATIENT
Start: 2017-01-07 | End: 2017-01-19

## 2017-01-07 NOTE — PROGRESS NOTES
[facility]  Interventional Neuroradiology Progress Note     Patient Status:  Inpatient    1950 MRN [id number]   [facility] 6NE-A Attending South Rios MD   [facility] Day # 4 PCP PHYSICIAN NONSTAFF     Subjective:  Roni Grey HCT 30.6 01/07/2017   .0 01/07/2017   CREATSERUM 0.78 01/07/2017   BUN 10 01/07/2017    01/07/2017    01/07/2017   K 3.4 01/07/2017    01/07/2017   CO2 24.0 01/07/2017    01/07/2017   CA 8.1 01/07/2017   ALB 3.3 01/07/2017 at 140.  2. Salt tablets 1 gram TID started per neuroCC  3. Continue to closely monitor Na levels    5. Hypertension    Will update Dr Angela Meredith with above.      CAITLIN Landrum  [facility]  1/7/2017, 7:38 AM  Spectre [id number]

## 2017-01-07 NOTE — PROGRESS NOTES
Neurosurgery Progress Note     SUBJECTIVE:  Interval History: 60 y/o R-handed AAF developed headache from [facility] from R ICA supraclinoid . anuerysm on 1/3/2017 requiring coiling. Resolving SAH causing some head pain, basia. When lying down.  Poor appetite and No

## 2017-01-07 NOTE — PLAN OF CARE
2000 PT'S VISITING WITH FAMILY. NEURO STATUS INTACT. LEFT UPPER EYE ABRASION FELISHA.  PT C/O SIMON RATES A 6 2 NORCO'S GIVEN. 0030 CALLED EARNEST APN WITH NA  141 RESULTS, DECREASED THE 3% NA INFUSION TO 25CC/HR. 0500 NOTIFIED SHERRON ALVES PT IS GETTING NO RELEIF F

## 2017-01-07 NOTE — PLAN OF CARE
Pt. Follows commands, ambulating halls x2, pain is controlled, vitals stable, 3% NACL stopped, serum sodiums q6, sitting in chair, will continue to monitor.       mra head completed 1/8/2017 @7134

## 2017-01-07 NOTE — PROGRESS NOTES
[facility]  Progress Note     [facility] Patient Status:  Inpatient    1950 MRN [id number]   [facility] 6NE-A Attending April Taylor MD   [address] Day # 4 PCP PHYSICIAN NONSTAFF     Chief Complaint: syncope    S: Patient with hours. No results for input(s): TROP, CK in the last 72 hours. Imaging: Imaging data reviewed in Epic.     Medications:   • sodium chloride  1 g Oral TID CC   • Metoclopramide HCl  5 mg Intravenous Once   • levETIRAcetam  500 mg Oral BID   • aspi

## 2017-01-07 NOTE — PROGRESS NOTES
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 1/5/2017- Headaches still there.   1/6/2016 - Noriega Oyster normally and no Ptosis.    # 5: Facial sensation to temp and light touch normal.    #7: No Facial asymmetry. # 8 Hearing normal.    # 9 & 10: Soft palate elevates normally. # 11: Shoulder shrug is normal and symmetrical.    #12:Tongue is midline.  Power o performed through the brain. Dose reduction techniques were used. PATIENT STATED HISTORY:  Patient grabbed her head and then fell out of chair. Patient had 30 seconds of LOC. Evaluate for bleed.              FINDINGS:   Diffuse subarachnoid hemorrhage throu and 57 cm/s mean velocity, respectively. The velocity of the basilar artery is 34 cm/s mean velocity. 1/4/2017  CONCLUSION:  Very limited data obtained. Please see above.     Dictated by: Ai Tolbert MD on 1/04/2017 at 7:56     Approved by: Salo Morocho with irregular contours projecting superiorly, posteriorly, and medially from the supine segment of the right internal carotid artery is the likely source of acute subarachnoid hemorrhage. This aneurysm measures up to 1.4 cm in greatest dimension.   The upp the cervical vertebral or carotid arteries. No evidence of hemodynamically significant carotid stenosis by NASCET criteria. 3. 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   4. Heterogenous thyroid gland would be better tapan aspirin EC tab 81 mg 81 mg Oral Daily   Heparin Sodium (Porcine) 5000 UNIT/ML injection 5,000 Units 5,000 Units Subcutaneous Q12H   niCARdipine HCl in NaCl (CARDENE) 20 mg/200 ml premix infusion 5-15 mg/hr Intravenous Continuous   famoTIDine (PEPCID) tab coiling. 15. Daily TCD's - limited exam today but velocities stable. Will get MRA on Sunday. 16. Repeat CT brain 1/6/2016  - decreasing ICH/SAH and no hydrocephalus. 17. Na level 143 today, will d/c 3% saline and continue salt tabs. Na check Q6hrs      T

## 2017-01-08 ENCOUNTER — APPOINTMENT (OUTPATIENT)
Dept: MRI IMAGING | Facility: HOSPITAL | Age: 67
DRG: 021 | End: 2017-01-08
Attending: Other
Payer: MEDICAID

## 2017-01-08 ENCOUNTER — APPOINTMENT (OUTPATIENT)
Dept: ULTRASOUND IMAGING | Facility: HOSPITAL | Age: 67
DRG: 021 | End: 2017-01-08
Attending: Other
Payer: MEDICAID

## 2017-01-08 LAB
ALBUMIN SERPL-MCNC: 2.9 G/DL (ref 3.5–4.8)
ALP LIVER SERPL-CCNC: 45 U/L (ref 50–130)
ALT SERPL-CCNC: 28 U/L (ref 14–54)
AST SERPL-CCNC: 18 U/L (ref 15–41)
BILIRUB SERPL-MCNC: 0.5 MG/DL (ref 0.1–2)
BUN BLD-MCNC: 8 MG/DL (ref 8–20)
BUN BLD-MCNC: 8 MG/DL (ref 8–20)
BUN BLD-MCNC: 9 MG/DL (ref 8–20)
CALCIUM BLD-MCNC: 7 MG/DL (ref 8.3–10.3)
CALCIUM BLD-MCNC: 7 MG/DL (ref 8.3–10.3)
CALCIUM BLD-MCNC: 8 MG/DL (ref 8.3–10.3)
CHLORIDE: 106 MMOL/L (ref 101–111)
CHLORIDE: 124 MMOL/L (ref 101–111)
CHLORIDE: 125 MMOL/L (ref 101–111)
CO2: 22 MMOL/L (ref 22–32)
CO2: 24 MMOL/L (ref 22–32)
CO2: 25 MMOL/L (ref 22–32)
CREAT BLD-MCNC: 0.54 MG/DL (ref 0.55–1.02)
CREAT BLD-MCNC: 0.58 MG/DL (ref 0.55–1.02)
CREAT BLD-MCNC: 0.62 MG/DL (ref 0.55–1.02)
ERYTHROCYTE [DISTWIDTH] IN BLOOD BY AUTOMATED COUNT: 12.1 % (ref 11.5–16)
GLUCOSE BLD-MCNC: 101 MG/DL (ref 70–99)
GLUCOSE BLD-MCNC: 101 MG/DL (ref 70–99)
GLUCOSE BLD-MCNC: 105 MG/DL (ref 65–99)
GLUCOSE BLD-MCNC: 106 MG/DL (ref 70–99)
GLUCOSE BLD-MCNC: 109 MG/DL (ref 65–99)
GLUCOSE BLD-MCNC: 116 MG/DL (ref 65–99)
GLUCOSE BLD-MCNC: 95 MG/DL (ref 65–99)
HAV IGM SER QL: 1.6 MG/DL (ref 1.7–3)
HAV IGM SER QL: 2.1 MG/DL (ref 1.7–3)
HCT VFR BLD AUTO: 30.2 % (ref 34–50)
HGB BLD-MCNC: 9.9 G/DL (ref 12–16)
M PROTEIN MFR SERPL ELPH: 6.1 G/DL (ref 6.1–8.3)
MCH RBC QN AUTO: 32.5 PG (ref 27–33.2)
MCHC RBC AUTO-ENTMCNC: 32.8 G/DL (ref 31–37)
MCV RBC AUTO: 99 FL (ref 81–100)
PLATELET # BLD AUTO: 160 10(3)UL (ref 150–450)
POTASSIUM SERPL-SCNC: 3.2 MMOL/L (ref 3.6–5.1)
POTASSIUM SERPL-SCNC: 3.7 MMOL/L (ref 3.6–5.1)
POTASSIUM SERPL-SCNC: 5.5 MMOL/L (ref 3.6–5.1)
POTASSIUM SERPL-SCNC: 5.5 MMOL/L (ref 3.6–5.1)
RBC # BLD AUTO: 3.05 X10(6)UL (ref 3.8–5.1)
RED CELL DISTRIBUTION WIDTH-SD: 43.8 FL (ref 35.1–46.3)
SODIUM SERPL-SCNC: 135 MMOL/L (ref 136–144)
SODIUM SERPL-SCNC: 137 MMOL/L (ref 136–144)
SODIUM SERPL-SCNC: 137 MMOL/L (ref 136–144)
SODIUM SERPL-SCNC: 138 MMOL/L (ref 136–144)
SODIUM SERPL-SCNC: 153 MMOL/L (ref 136–144)
SODIUM SERPL-SCNC: 154 MMOL/L (ref 136–144)
WBC # BLD AUTO: 6.9 X10(3) UL (ref 4–13)

## 2017-01-08 PROCEDURE — 93886 INTRACRANIAL COMPLETE STUDY: CPT

## 2017-01-08 PROCEDURE — 70544 MR ANGIOGRAPHY HEAD W/O DYE: CPT

## 2017-01-08 PROCEDURE — 99233 SBSQ HOSP IP/OBS HIGH 50: CPT | Performed by: HOSPITALIST

## 2017-01-08 PROCEDURE — 99291 CRITICAL CARE FIRST HOUR: CPT | Performed by: OTHER

## 2017-01-08 RX ORDER — FLUDROCORTISONE ACETATE 0.1 MG/1
0.1 TABLET ORAL 2 TIMES DAILY
Status: DISCONTINUED | OUTPATIENT
Start: 2017-01-08 | End: 2017-01-08

## 2017-01-08 RX ORDER — MAGNESIUM OXIDE 400 MG (241.3 MG MAGNESIUM) TABLET
400 TABLET ONCE
Status: COMPLETED | OUTPATIENT
Start: 2017-01-08 | End: 2017-01-08

## 2017-01-08 RX ORDER — SODIUM CHLORIDE 1000 MG
1 TABLET, SOLUBLE MISCELLANEOUS
Status: DISCONTINUED | OUTPATIENT
Start: 2017-01-08 | End: 2017-01-16

## 2017-01-08 RX ORDER — POTASSIUM CHLORIDE 29.8 MG/ML
40 INJECTION INTRAVENOUS ONCE
Status: COMPLETED | OUTPATIENT
Start: 2017-01-08 | End: 2017-01-08

## 2017-01-08 RX ORDER — 3% SODIUM CHLORIDE 3 G/100ML
INJECTION, SOLUTION INTRAVENOUS CONTINUOUS
Status: DISCONTINUED | OUTPATIENT
Start: 2017-01-08 | End: 2017-01-10

## 2017-01-08 RX ORDER — POTASSIUM CHLORIDE 14.9 MG/ML
20 INJECTION INTRAVENOUS ONCE
Status: COMPLETED | OUTPATIENT
Start: 2017-01-08 | End: 2017-01-08

## 2017-01-08 RX ORDER — SODIUM CHLORIDE 9 MG/ML
INJECTION, SOLUTION INTRAVENOUS CONTINUOUS
Status: DISCONTINUED | OUTPATIENT
Start: 2017-01-08 | End: 2017-01-09

## 2017-01-08 RX ORDER — FLUDROCORTISONE ACETATE 0.1 MG/1
0.1 TABLET ORAL 2 TIMES DAILY
Status: DISCONTINUED | OUTPATIENT
Start: 2017-01-08 | End: 2017-01-09

## 2017-01-08 NOTE — PROGRESS NOTES
[facility]  Progress Note     Greta Quinnpam Patient Status:  Inpatient    1950 MRN [id number]   [facility] 6NE-A Attending Broderick Shah MD   Hosp Day # 5 PCP PHYSICIAN NONSTAFF     Chief Complaint: syncope    S: Patient c/o --   0.5   TP   --    --   7.1   --    --    --   6.1    < > = values in this interval not displayed. No results for input(s): PTP, INR in the last 72 hours. No results for input(s): TROP, CK in the last 72 hours.          Imaging: Imaging data rev

## 2017-01-08 NOTE — PROGRESS NOTES
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 1/5/2017- Headaches still there.   1/6/2017 - Case Haley Functions Normal    Cranial Nerves  # 2: Visual acuity and fields normal on gross exam. Pupils equal, round, reactive to light and accomodation.    # 3, 4, 6: Eyes move in all 6 cardinal directions normally and no Ptosis.    # 5: Facial sensation to temp a by: Jolanta Askew MD            Ct Brain Or Head ([id number])    1/3/2017  PROCEDURE:  CT OF THE BRAIN WITHOUT CONTRAST  COMPARISON:  None. INDICATIONS:  syncope  TECHNIQUE:  Noncontrast CT scanning is performed through the brain.  Dose reduction techniques we compatible. \" Some velocity is obtained, as follows. The right and left vertebral artery demonstrated 35, and 40 cm/s mean velocity, respectively. The right and left cervical ICA demonstrates a 34, and 57 cm/s mean velocity, respectively.  The velocity of t characterized and concerning for a primary parotid neoplasm.  This should be further characterized with dedicated MRI of the soft tissues of the neck with and without contrast.  There is a saccular aneurysm with irregular contours projecting superiorly, pos irregular aneurysm along the supraclinoid segment of the right internal carotid artery is likely the source of acute subarachnoid hemorrhage. 2. No evidence of occlusion, dissection, or flow-limiting stenosis in the cervical vertebral or carotid arteries. suspension 30 mL 30 mL Oral Daily PRN   Metoclopramide HCl (REGLAN) injection 5 mg 5 mg Intravenous Once   Normal Saline Flush 0.9 % injection 10 mL 10 mL Intravenous PRN   Metoclopramide HCl (REGLAN) injection 5 mg 5 mg Intravenous Q6H PRN   levETIRAcetam - decreasing ICH/SAH and no hydrocephalus. MRA later today. 7. IV Fluids 0.9 normal saline  8. DVT Prophylaxis. 9. GI Prophylaxis. 10. PT/OT Eval.  11. Speech Eval.  12. Neurosurgery Consulted - Abilio Gann consulted.   15. Neurointervention Consult-

## 2017-01-08 NOTE — PLAN OF CARE
2000 RECEIVED REPORT, PT UP IN CHAIR, NEURO STATUS INTACT. MOD ABRASION TO LEFT EYE HEALING WELL  FELISHA. PT C/O HA NORCO X2 GIVEN. 2200 ASSISTED UP TO BR PT TOLERATED WELL. 2330 PT RETURNED TO BED, SHE HAS BEEN UP ALL DAY, SHE STATES IT HELPS WITH HER HA BY LASHA

## 2017-01-08 NOTE — PROGRESS NOTES
Neurosurgery Progress Note     SUBJECTIVE:  Interval History: Pt. Has a constant aching headache today. Up in chair. Up walking earlier. Eating but no BM. [place] Pencil    Pt. Denies dizziness, neck pain,N and V.  OBJECTIVE:  Vital signs   Temp:  [98.6 °F (37 °C)-98.7 °

## 2017-01-08 NOTE — PROGRESS NOTES
[facility]  Interventional Neuroradiology Progress Note    Billie Flores Patient Status:  Inpatient    1950 MRN [id number]   [facility] 6NE-A Attending Johanna Kendrick MD   [facility] Day # 5 PCP PHYSICIAN NONSTAFF     Subjective:  Fredo Parisi 01/08/2017   K 5.5 01/08/2017    01/08/2017   CO2 24.0 01/08/2017    01/08/2017   CA 7.0 01/08/2017   MG 1.6 01/08/2017   PGLU 95 01/08/2017       Imaging:  MRA 1/08/17 Brain pending  TCDs 1/08/17 pending    TCDs 1/07/17:  Vandana Saenz 75mL/hr  2. Salt tablets 1 gram TID have been d/c'd  3. Continue to closely monitor Na levels    5.  Hypertension      Chiquita Gutierrez, [address] [facility] [address]  1/8/2017, 8:43 AM  Spectre [id number]    I saw and examined patient, agree with above and t

## 2017-01-08 NOTE — PLAN OF CARE
Pt. Follows commands, ambulating halls, pain is controlled, vitals stable, MRA head completed today. Will continue to monitor.

## 2017-01-09 ENCOUNTER — APPOINTMENT (OUTPATIENT)
Dept: ULTRASOUND IMAGING | Facility: HOSPITAL | Age: 67
DRG: 021 | End: 2017-01-09
Attending: Other
Payer: MEDICAID

## 2017-01-09 LAB
ALBUMIN SERPL-MCNC: 3.4 G/DL (ref 3.5–4.8)
ALP LIVER SERPL-CCNC: 49 U/L (ref 50–130)
ALT SERPL-CCNC: 35 U/L (ref 14–54)
AST SERPL-CCNC: 22 U/L (ref 15–41)
BASOPHILS # BLD AUTO: 0.05 X10(3) UL (ref 0–0.1)
BASOPHILS NFR BLD AUTO: 0.6 %
BILIRUB SERPL-MCNC: 0.6 MG/DL (ref 0.1–2)
BUN BLD-MCNC: 10 MG/DL (ref 8–20)
CALCIUM BLD-MCNC: 8.4 MG/DL (ref 8.3–10.3)
CHLORIDE: 102 MMOL/L (ref 101–111)
CO2: 28 MMOL/L (ref 22–32)
CREAT BLD-MCNC: 0.73 MG/DL (ref 0.55–1.02)
EOSINOPHIL # BLD AUTO: 0.04 X10(3) UL (ref 0–0.3)
EOSINOPHIL NFR BLD AUTO: 0.5 %
ERYTHROCYTE [DISTWIDTH] IN BLOOD BY AUTOMATED COUNT: 11.9 % (ref 11.5–16)
GLUCOSE BLD-MCNC: 105 MG/DL (ref 70–99)
GLUCOSE BLD-MCNC: 122 MG/DL (ref 65–99)
GLUCOSE BLD-MCNC: 86 MG/DL (ref 65–99)
GLUCOSE BLD-MCNC: 89 MG/DL (ref 65–99)
GLUCOSE BLD-MCNC: 90 MG/DL (ref 65–99)
HAV IGM SER QL: 2.1 MG/DL (ref 1.7–3)
HCT VFR BLD AUTO: 35.9 % (ref 34–50)
HGB BLD-MCNC: 12.3 G/DL (ref 12–16)
IMMATURE GRANULOCYTE COUNT: 0.04 X10(3) UL (ref 0–1)
IMMATURE GRANULOCYTE RATIO %: 0.5 %
LYMPHOCYTES # BLD AUTO: 1.73 X10(3) UL (ref 0.9–4)
LYMPHOCYTES NFR BLD AUTO: 20.8 %
M PROTEIN MFR SERPL ELPH: 7.2 G/DL (ref 6.1–8.3)
MCH RBC QN AUTO: 32.5 PG (ref 27–33.2)
MCHC RBC AUTO-ENTMCNC: 34.3 G/DL (ref 31–37)
MCV RBC AUTO: 94.7 FL (ref 81–100)
MONOCYTES # BLD AUTO: 0.76 X10(3) UL (ref 0.1–0.6)
MONOCYTES NFR BLD AUTO: 9.2 %
NEUTROPHIL ABS PRELIM: 5.68 X10 (3) UL (ref 1.3–6.7)
NEUTROPHILS # BLD AUTO: 5.68 X10(3) UL (ref 1.3–6.7)
NEUTROPHILS NFR BLD AUTO: 68.4 %
PLATELET # BLD AUTO: 216 10(3)UL (ref 150–450)
POTASSIUM SERPL-SCNC: 3.8 MMOL/L (ref 3.6–5.1)
POTASSIUM SERPL-SCNC: 4 MMOL/L (ref 3.6–5.1)
RBC # BLD AUTO: 3.79 X10(6)UL (ref 3.8–5.1)
RED CELL DISTRIBUTION WIDTH-SD: 41.3 FL (ref 35.1–46.3)
SODIUM SERPL-SCNC: 137 MMOL/L (ref 136–144)
SODIUM SERPL-SCNC: 138 MMOL/L (ref 136–144)
SODIUM SERPL-SCNC: 138 MMOL/L (ref 136–144)
WBC # BLD AUTO: 8.3 X10(3) UL (ref 4–13)

## 2017-01-09 PROCEDURE — 99233 SBSQ HOSP IP/OBS HIGH 50: CPT | Performed by: HOSPITALIST

## 2017-01-09 PROCEDURE — 99232 SBSQ HOSP IP/OBS MODERATE 35: CPT | Performed by: CLINICAL NURSE SPECIALIST

## 2017-01-09 PROCEDURE — 99291 CRITICAL CARE FIRST HOUR: CPT | Performed by: OTHER

## 2017-01-09 PROCEDURE — 93886 INTRACRANIAL COMPLETE STUDY: CPT

## 2017-01-09 RX ORDER — HEPARIN SODIUM 5000 [USP'U]/ML
5000 INJECTION, SOLUTION INTRAVENOUS; SUBCUTANEOUS EVERY 8 HOURS
Status: DISCONTINUED | OUTPATIENT
Start: 2017-01-09 | End: 2017-01-19

## 2017-01-09 RX ORDER — FLUDROCORTISONE ACETATE 0.1 MG/1
0.2 TABLET ORAL 2 TIMES DAILY
Status: DISCONTINUED | OUTPATIENT
Start: 2017-01-09 | End: 2017-01-12

## 2017-01-09 NOTE — PROGRESS NOTES
[facility]  Neuro Critical Care Progress Note    Billie Flores Patient Status:  Inpatient    1950 MRN [id number]   [facility] 6NE-A Attending Johanna Kendrick MD   Hosp Day # 6 PCP PHYSICIAN NONSTAFF     Subjective:  Headache is naveen 30 mL, Oral, Daily PRN  •  Normal Saline Flush 0.9 % injection 10 mL, 10 mL, Intravenous, PRN  •  Metoclopramide HCl (REGLAN) injection 5 mg, 5 mg, Intravenous, Q6H PRN  •  acetaminophen (TYLENOL) tab 650 mg, 650 mg, Oral, Q6H PRN  •  aspirin EC tab 81 mg, s/p coiling PBD#6  2. Headache due to # 1 above. 3. HTN  4. Cerebral salt wasting    Neuro:  -MRA shows no vasospasm, Shows neck of aneurysm remnant, needs follow up imaging.   -TCDs with no windows will d/c  -D/C Keppra  -Every hour neuro checks and q2 at

## 2017-01-09 NOTE — CM/SW NOTE
MSRUDY received a call from Monroe at [address] [place] [address]. She is available to assist with dc [phone].     Dariusz Hathaway LCSW

## 2017-01-09 NOTE — PROGRESS NOTES
[facility]  Interventional Neuroradiology Progress Note    Connye Ground Patient Status:  Inpatient    1950 MRN [id number]   [facility] 6NE-A Attending Jeevan Story MD   Hosp Day # 6 PCP PHYSICIAN NONSTAFF         Subjective:  Car focal extra-axial hemorrhage posterior left frontal lobe now measuring 11 x 9 mm. No interval acute hemorrhage. Patient status post right parasellar aneurysm coiling with extensive beam hardening artifact.   No hydrocephalus    1/8 MRA Brain:  There is a c    12. PT/OT when able  13. Monitor for vasospasm  14. Neuro's Q 1 hour    15. NS following for possible development of hydrocephalus  1. HCT 1/6: no hydrocephalus, decrease in hemorrhages  16. NeuroCC following  17.  Echo 70-75% EF, no wall abnormalities

## 2017-01-09 NOTE — PROGRESS NOTES
Neurosurgery Progress Note     SUBJECTIVE:  Interval History: stable.      OBJECTIVE:  Vital signs   Temp:  [98.2 °F (36.8 °C)-100.1 °F (37.8 °C)] 100.1 °F (37.8 °C)  Pulse:  [62-93] 74  Resp:  [4-20] 16  BP: (121-155)/(70-90) 130/75 mmHg      Intake/Output

## 2017-01-09 NOTE — PHYSICAL THERAPY NOTE
PHYSICAL THERAPY TREATMENT NOTE - INPATIENT    Room Number: 3931/8881-R     Session: 2     Number of Visits to Meet Established Goals: 3    Presenting Problem: SAH.  QUINN aneursym s/p coiling 1/3/17    Problem List  Principal Problem:    SAH (subarachnoid -   Climbing 3-5 steps with a railing?: A Little    AM-PAC Score:  Raw Score: 20   PT Approx Degree of Impairment Score: 35.83%   Standardized Score (AM-PAC Scale): 47.67   CMS Modifier (G-Code):     FUNCTIONAL ABILITY STATUS  Gait Assessment   Gait As to/from [facility] at assistance level: modified independent        Goal #3   Patient is able to ambulate 150 feet with assist device: none at assistance level: modified independent        Goal #4   Pt will ascend/descend 1 flight of stairs with MOD I.       Goal

## 2017-01-09 NOTE — PLAN OF CARE
Assume care 0730. Neurologically intact, oriented x4. States \"today I am actually hungry and seems like the best day I have had this week. \" Up to chair. Speech fluent, clear. JUAREZ, equal strenght.   No ataxia on assessment but pt slightly unsteady at t

## 2017-01-09 NOTE — PLAN OF CARE
2000 PT SITTING UP IN CHAIR, STATES SHE HAS A HA RATES AS A 2, REFUSES PAIN MED AT THIS TIME. 2200 PT RETURNED TO BED 2 NORCO GIVEN FOR HA. 2100 CALLED DR LACEY WITH MRA RESULTS.  NO ORDERS FOR NOW. 2330 LABS DRAWN. 0030 NOTIFIED APN OF NA RESULT, ORDERS T

## 2017-01-09 NOTE — PROGRESS NOTES
[facility]  Progress Note     Adonica Backers Patient Status:  Inpatient    1950 MRN [id number]   [facility] 6NE-A Attending Chuck William MD   Hosp Day # 6 PCP PHYSICIAN NONSTAFF     Chief Complaint: syncope    S: Patient repo --   22   ALT  38   --   28   --    --    --   35   BILT  0.8   --   0.5   --    --    --   0.6   TP  7.1   --   6.1   --    --    --   7.2    < > = values in this interval not displayed. No results for input(s): PTP, INR in the last 72 hours.

## 2017-01-10 ENCOUNTER — APPOINTMENT (OUTPATIENT)
Dept: CT IMAGING | Facility: HOSPITAL | Age: 67
DRG: 021 | End: 2017-01-10
Attending: NEUROLOGICAL SURGERY
Payer: MEDICAID

## 2017-01-10 ENCOUNTER — APPOINTMENT (OUTPATIENT)
Dept: ULTRASOUND IMAGING | Facility: HOSPITAL | Age: 67
DRG: 021 | End: 2017-01-10
Attending: Other
Payer: MEDICAID

## 2017-01-10 LAB
SODIUM SERPL-SCNC: 136 MMOL/L (ref 136–144)
SODIUM SERPL-SCNC: 138 MMOL/L (ref 136–144)
SODIUM SERPL-SCNC: 140 MMOL/L (ref 136–144)

## 2017-01-10 PROCEDURE — 70450 CT HEAD/BRAIN W/O DYE: CPT

## 2017-01-10 PROCEDURE — 99233 SBSQ HOSP IP/OBS HIGH 50: CPT | Performed by: HOSPITALIST

## 2017-01-10 PROCEDURE — 99291 CRITICAL CARE FIRST HOUR: CPT | Performed by: OTHER

## 2017-01-10 RX ORDER — NIMODIPINE 30 MG/1
60 CAPSULE, LIQUID FILLED ORAL
Qty: 84 CAPSULE | Refills: 0 | Status: SHIPPED | OUTPATIENT
Start: 2017-01-10 | End: 2017-01-12

## 2017-01-10 NOTE — PROGRESS NOTES
Neurosurgery Progress Note     SUBJECTIVE:  Interval History: stable.      OBJECTIVE:  Vital signs   Temp:  [98.6 °F (37 °C)-98.9 °F (37.2 °C)] 98.9 °F (37.2 °C)  Pulse:  [70-98] 80  Resp:  [10-21] 21  BP: (105-160)/() 134/109 mmHg      Intake/Output

## 2017-01-10 NOTE — PROGRESS NOTES
[facility]  Progress Note     Lisa Clanat Patient Status:  Inpatient    1950 MRN [id number]   [facility] 6NE-A Attending Misty Plummer MD   Hosp Day # 7 PCP PHYSICIAN NONSTAFF     Chief Complaint: syncope    S: Patient repo 25.0   --    --   28.0   --    --    --    --    ALKPHO  45*   --    --    --   49*   --    --    --    --    AST  18   --    --    --   22   --    --    --    --    ALT  28   --    --    --   35   --    --    --    --    BILT  0.5   --    --    --   0.6 RRR  CTA b/l  Soft, NT, ND, +BS  No LE edema    CT shows improvement. Off 3% NS. Cont TCDs daily  Home with outpt PT when ready for d/c.     Rodolfo Bumpers  1/10/2017

## 2017-01-10 NOTE — CM/SW NOTE
Pt will need nimErinp for d/c.  Knife pharmacist took script to [facility] and will need pre auth. They gave me #[phone]. When I called this was patrick and they are not pt's medication insurance. Pt has [employer].  Pt's insurance cards are not sc

## 2017-01-10 NOTE — OCCUPATIONAL THERAPY NOTE
OCCUPATIONAL THERAPY TREATMENT NOTE - INPATIENT     Room Number: 9295/6143-W  Session: 2   Number of Visits to Meet Established Goals: 5    Presenting Problem: SAH    History related to current admission: Pt is a 61 y.o.  Female admitted 1/3/17 with HA and 44.27  CMS Modifier (G-Code): ALIRIO    FUNCTIONAL TRANSFER ASSESSMENT  Supine to Sit : Supervision  Sit to Stand: Supervision    Skilled Therapy Provided: Pt was received up in chair for session, pt amb in room and hallway with min assist, pt required break d

## 2017-01-10 NOTE — PLAN OF CARE
Assumed care of pt at [address] [place] [address]. Pt is alert and oriented x 4. Pt has full ROM in all four extremities. EOM are intact. Pt is able to walk with standby assist to the bathroom. Pt is on 3%Na+ and has continuous Na+ draws for U4ayxyx.  Pt has mild pain 2/10, medic

## 2017-01-10 NOTE — PROGRESS NOTES
[facility]  Neuro Critical Care Progress Note    [facility] Patient Status:  Inpatient    1950 MRN [id number]   [facility] 6NE-A Attending Radha Rhodes MD   Hosp Day # 7 PCP PHYSICIAN NONSTAFF     Subjective:  Headache is naveen Flush 0.9 % injection 10 mL, 10 mL, Intravenous, PRN  •  Metoclopramide HCl (REGLAN) injection 5 mg, 5 mg, Intravenous, Q6H PRN  •  acetaminophen (TYLENOL) tab 650 mg, 650 mg, Oral, Q6H PRN  •  aspirin EC tab 81 mg, 81 mg, Oral, Daily  •  niCARdipine HCl i monitor    Renal:  -On 3% at 30 cc/hr sodium is at 140 today, will hold off drip and follow sodium tonight  -Increase Florinef to 0.2 mg q12h. -Monitor ins and outs.     GI:  -On diet    Heme/ID:  -Monitor H&H goal hemoglobin more than 7.  -Afebrile no lane

## 2017-01-10 NOTE — PHYSICAL THERAPY NOTE
PHYSICAL THERAPY TREATMENT NOTE - INPATIENT    Room Number: 1157/5045-C     Session: 3     Number of Visits to Meet Established Goals: 3    Presenting Problem: SAH.  QUINN aneursym s/p coiling 1/3/17    Problem List  Principal Problem:    SAH (subarachnoid Little   -   Need to walk in hospital room?: A Little   -   Climbing 3-5 steps with a railing?: A Little    AM-PAC Score:  Raw Score: 21   PT Approx Degree of Impairment Score: 28.97%   Standardized Score (AM-PAC Scale): 50.25   CMS Modifier (G-Code): CJ Recommendations: Home;Outpatient PT     PLAN  PT Treatment Plan: Balance training;Transfer training;Stair training;Gait training;Neuromuscular re-educate; Patient education; Family education; Endurance    CURRENT GOALS   Goal #1   Patient is able to Danny Sapp

## 2017-01-11 LAB
BASOPHILS # BLD AUTO: 0.08 X10(3) UL (ref 0–0.1)
BASOPHILS NFR BLD AUTO: 0.7 %
BUN BLD-MCNC: 15 MG/DL (ref 8–20)
CALCIUM BLD-MCNC: 8.5 MG/DL (ref 8.3–10.3)
CHLORIDE: 101 MMOL/L (ref 101–111)
CO2: 29 MMOL/L (ref 22–32)
CREAT BLD-MCNC: 0.82 MG/DL (ref 0.55–1.02)
EOSINOPHIL # BLD AUTO: 0.2 X10(3) UL (ref 0–0.3)
EOSINOPHIL NFR BLD AUTO: 1.9 %
ERYTHROCYTE [DISTWIDTH] IN BLOOD BY AUTOMATED COUNT: 11.9 % (ref 11.5–16)
GLUCOSE BLD-MCNC: 106 MG/DL (ref 65–99)
GLUCOSE BLD-MCNC: 120 MG/DL (ref 70–99)
HAV IGM SER QL: 2 MG/DL (ref 1.7–3)
HCT VFR BLD AUTO: 35.5 % (ref 34–50)
HGB BLD-MCNC: 12.2 G/DL (ref 12–16)
IMMATURE GRANULOCYTE COUNT: 0.04 X10(3) UL (ref 0–1)
IMMATURE GRANULOCYTE RATIO %: 0.4 %
LYMPHOCYTES # BLD AUTO: 3.25 X10(3) UL (ref 0.9–4)
LYMPHOCYTES NFR BLD AUTO: 30.4 %
MCH RBC QN AUTO: 32.4 PG (ref 27–33.2)
MCHC RBC AUTO-ENTMCNC: 34.4 G/DL (ref 31–37)
MCV RBC AUTO: 94.2 FL (ref 81–100)
MONOCYTES # BLD AUTO: 1.09 X10(3) UL (ref 0.1–0.6)
MONOCYTES NFR BLD AUTO: 10.2 %
NEUTROPHIL ABS PRELIM: 6.04 X10 (3) UL (ref 1.3–6.7)
NEUTROPHILS # BLD AUTO: 6.04 X10(3) UL (ref 1.3–6.7)
NEUTROPHILS NFR BLD AUTO: 56.4 %
PHOSPHATE SERPL-MCNC: 3.7 MG/DL (ref 2.5–4.9)
PLATELET # BLD AUTO: 229 10(3)UL (ref 150–450)
POTASSIUM SERPL-SCNC: 3.5 MMOL/L (ref 3.6–5.1)
POTASSIUM SERPL-SCNC: 3.9 MMOL/L (ref 3.6–5.1)
RBC # BLD AUTO: 3.77 X10(6)UL (ref 3.8–5.1)
RED CELL DISTRIBUTION WIDTH-SD: 40.9 FL (ref 35.1–46.3)
SODIUM SERPL-SCNC: 136 MMOL/L (ref 136–144)
WBC # BLD AUTO: 10.7 X10(3) UL (ref 4–13)

## 2017-01-11 PROCEDURE — 99233 SBSQ HOSP IP/OBS HIGH 50: CPT | Performed by: OTHER

## 2017-01-11 PROCEDURE — 99233 SBSQ HOSP IP/OBS HIGH 50: CPT | Performed by: HOSPITALIST

## 2017-01-11 RX ORDER — POTASSIUM CHLORIDE 20 MEQ/1
40 TABLET, EXTENDED RELEASE ORAL EVERY 4 HOURS
Status: COMPLETED | OUTPATIENT
Start: 2017-01-11 | End: 2017-01-11

## 2017-01-11 RX ORDER — NIMODIPINE 30 MG/1
30 CAPSULE, LIQUID FILLED ORAL
Status: DISCONTINUED | OUTPATIENT
Start: 2017-01-11 | End: 2017-01-19

## 2017-01-11 NOTE — PROGRESS NOTES
Neurosurgery Progress Note     SUBJECTIVE:  Interval History: Episode of orthostasis this am but resolved now with change in dosing of Nimodipine to same total dose at more frequent intervals     OBJECTIVE:  Vital signs   Temp:  [98.3 °F (36.8 °C)-99.5 °F

## 2017-01-11 NOTE — OCCUPATIONAL THERAPY NOTE
Second attempt to see pt for OT session, but pt declining asking to later session d/t wanting to rest at this time. Will f/u again later as time allows.

## 2017-01-11 NOTE — PLAN OF CARE
Pt a/ox4. A little \"off\" today, some fatigue. Ambulating in flores, in chair several hours, napping periodically through day. No other new changes in neuro assessment. No other new complaints. Headache1/10 and mild, needing less pain medication.   Appe

## 2017-01-11 NOTE — OCCUPATIONAL THERAPY NOTE
Attempted to see pt for OT tx, but pt had fainting episode in the bathroom with staff this morning. Awaiting additional direction on mobility at this time. Will f/u again later as appropriate and as time allows. RN in agreement.

## 2017-01-11 NOTE — PHYSICAL THERAPY NOTE
PHYSICAL THERAPY TREATMENT NOTE - INPATIENT    Room Number: 3436/2038-I     Session: 4     Number of Visits to Meet Established Goals: 3    Presenting Problem: SAH.  QUINN aneursym s/p coiling 1/3/17    Problem List  Principal Problem:    SAH (subarachnoid -   Climbing 3-5 steps with a railing?: A Little    AM-PAC Score:  Raw Score: 21   PT Approx Degree of Impairment Score: 28.97%   Standardized Score (AM-PAC Scale): 50.25   CMS Modifier (G-Code):     FUNCTIONAL ABILITY STATUS  Gait Assessment   Gait As re-educate; Patient education; Family education; Endurance    CURRENT GOALS   Goal #1   Patient is able to demonstrate supine - sit EOB @ level: modified independent- MET        Goal #2   Patient is able to demonstrate transfers EOB to/from [facility] at assistance

## 2017-01-11 NOTE — PROGRESS NOTES
Approx. 5710 with patient up in the bathroom, patient called and verbalized that she is feeling dizzy. While in the bathroom on the way back to the bed with nurse, patient fainted for about 3 seconds. BP was checked 100/80.  Blood sugar was checked 106 mg/d

## 2017-01-11 NOTE — PROGRESS NOTES
[facility]  Neuro Critical Care Progress Note    Winchester Furbish Patient Status:  Inpatient    1950 MRN [id number]   [facility] 6NE-A Attending Ingrid Aggarwal MD   Hosp Day # 8 PCP PHYSICIAN NONSTAFF     Subjective:  Sodium stable tod Oral, Q6H PRN  •  magnesium hydroxide (MILK OF MAGNESIA) 400 MG/5ML suspension 30 mL, 30 mL, Oral, Daily PRN  •  Normal Saline Flush 0.9 % injection 10 mL, 10 mL, Intravenous, PRN  •  Metoclopramide HCl (REGLAN) injection 5 mg, 5 mg, Intravenous, Q6H PRN vessel imaging on Friday  -Every hour neuro checks and q2 at night.  -She is in the vasospasm window with cerebral salt wasting as will.  Continue ICU care for now  -Decrease Nimotop dose to 30 mg given orthostatics this morning  -PT OT ST     Cardiac:  -Sy

## 2017-01-11 NOTE — PROGRESS NOTES
[facility]  Progress Note     Oralia Ridley Patient Status:  Inpatient    1950 MRN [id number]   [facility] 6NE-A Attending Thong Millan MD   Hosp Day # 8 PCP PHYSICIAN NONSTAFF     Chief Complaint: syncope    S: Patient repo 28.0   --    --    --   29.0   ALKPHO   --    --    --   49*   --    --    --    --    AST   --    --    --   22   --    --    --    --    ALT   --    --    --   35   --    --    --    --    BILT   --    --    --   0.6   --    --    --    --    TP   -- (62.2 kg)  BMI 23.53 kg/m2  SpO2 100%  AOx3, NAD  S1+S2, RRR  CTA b/l  Soft, NT, ND, +BS  No LE edema    Meds adjusted per neuro  ? repeat CT  Continue javier Mendoza  1/11/2017

## 2017-01-11 NOTE — PHYSICAL THERAPY NOTE
Attempted to see Pt x 2 this am. 1st attempt awaiting activity clearance from neuro s/p orthostatic episode this morning. Pt cleared for activity later this am, however, Pt requesting to rest at this time. Will follow up later, as schedule permits.

## 2017-01-11 NOTE — CM/SW NOTE
Pt did not get number from daughter. She did have a number in her phone [phone] sent to 3 different people and finally got right number.    Faxed auth papers to [employer]   Phone number to check status [phone]    Await approval    Ta

## 2017-01-11 NOTE — PLAN OF CARE
Impaired Activities of Daily Living    • Achieve highest/safest level of independence in self care Progressing        Impaired Functional Mobility    • Achieve highest/safest level of mobility/gait Progressing        NEUROLOGICAL - ADULT    • Achieves stab

## 2017-01-12 PROCEDURE — 99233 SBSQ HOSP IP/OBS HIGH 50: CPT | Performed by: HOSPITALIST

## 2017-01-12 PROCEDURE — 99233 SBSQ HOSP IP/OBS HIGH 50: CPT | Performed by: OTHER

## 2017-01-12 RX ORDER — FLUDROCORTISONE ACETATE 0.1 MG/1
0.1 TABLET ORAL 2 TIMES DAILY
Status: DISCONTINUED | OUTPATIENT
Start: 2017-01-12 | End: 2017-01-16

## 2017-01-12 RX ORDER — NIMODIPINE 30 MG/1
30 CAPSULE, LIQUID FILLED ORAL
Qty: 42 CAPSULE | Refills: 0 | Status: SHIPPED | OUTPATIENT
Start: 2017-01-16 | End: 2017-01-16

## 2017-01-12 NOTE — PROGRESS NOTES
[facility]  Neuro Critical Care Progress Note    Lexis Holley Patient Status:  Inpatient    1950 MRN [id number]   [facility] 6NE-A Attending Terri Ballard MD   Hosp Day # 9 PCP PHYSICIAN NONSTAFF     Subjective:  Sodium stable tod 30 mL, 30 mL, Oral, Daily PRN  •  Normal Saline Flush 0.9 % injection 10 mL, 10 mL, Intravenous, PRN  •  Metoclopramide HCl (REGLAN) injection 5 mg, 5 mg, Intravenous, Q6H PRN  •  acetaminophen (TYLENOL) tab 650 mg, 650 mg, Oral, Q6H PRN  •  aspirin EC tab monitor    Renal:  -Will recheck sodium today. Decrease florinef if stable  -Monitor ins and outs. GI:  -On diet    Heme/ID:  -Monitor H&H goal hemoglobin more than 7.  -Afebrile no leukocytosis.   Culture if fever more than 100.5°F.    Skin:  -Monitor f

## 2017-01-12 NOTE — PLAN OF CARE
Assumed care of patient this a.m. @ 7615. Patient is up in chair, A/O x3. VSS. Patient c/o HA 1/10.  Received tylenol earlier this ascarlet. Dr. Valery Snowden

## 2017-01-12 NOTE — CM/SW NOTE
01/12/17 1400   CM/SW Referral Data   Referral Source Social Work (self-referral)   Reason for Referral Discharge planning   Informant Patient   Patient Info   Patient's Mental Status Alert;Oriented   Patient's Home Environment Condo/Apt no elevator   N

## 2017-01-12 NOTE — PLAN OF CARE
Problem: NEUROLOGICAL - ADULT  Goal: Achieves stable or improved neurological status  INTERVENTIONS  - Assess for and report changes in neurological status  - Maintain blood pressure and fluid volume within ordered parameters to optimize cerebral perfusion

## 2017-01-12 NOTE — OCCUPATIONAL THERAPY NOTE
OCCUPATIONAL THERAPY TREATMENT NOTE - INPATIENT     Room Number: 2160/9085-S  Session: 3  Number of Visits to Meet Established Goals: 5    Presenting Problem: SAH    History related to current admission: Pt is a 61 y.o.  Female admitted 1/3/17 with HA and s 25.8%  Standardized Score (AM-PAC Scale): 47.1  CMS Modifier (G-Code): CJ    FUNCTIONAL TRANSFER ASSESSMENT  Supine to Sit : Modified independent  Sit to Stand: Modified independent    Skilled Therapy Provided: Pt was received up in chair for session, pt a

## 2017-01-12 NOTE — PROGRESS NOTES
[facility]PITAL[facility]  Progress Note     Blanca Quan Patient Status:  Inpatient    1950 MRN [id number]   [facility] 6NE-A Attending Elliot Carrasco MD   Hosp Day # 9 PCP PHYSICIAN NONSTAFF     Chief Complaint: syncope    S: Patient reports hemorrhage/R PCA aneurysm  1. S/P angio with coiling 1/3/17  2. Neurology and NS following  3. Nimotop per neuro  4. Repeat brain imaging to re-eval  5. 2D echo overall unremarkable  6. TCDs pending  2. Near syncope with Dizziness/nausea/HA, resolved  3.  H

## 2017-01-12 NOTE — PROGRESS NOTES
Neurosurgery Progress Note     SUBJECTIVE:  Interval History: stable.      OBJECTIVE:  Vital signs   Temp:  [98.2 °F (36.8 °C)-99.7 °F (37.6 °C)] 98.2 °F (36.8 °C)  Pulse:  [60-92] 65  Resp:  [9-19] 11  BP: (100-153)/(43-98) 137/83 mmHg      Intake/Output

## 2017-01-12 NOTE — PAYOR COMM NOTE
Attending Physician: Leslie Sahni MD    Review Type: CONTINUED STAY  Reviewer: Blanca Webb     Date: January 12, 2017 - 12:43 PM  Payor: Amrita Montez Number: [id number]  Admit date: 1/3/2017  2:21 PM        REVIEWER COMMENTS  Vitals: 1 Oral Sampson Harrington RN      Fludrocortisone Acetate (FLORINEF) tab 0.1 mg     Date Action Dose Route User    1/12/2017 8567 Given 0.1 mg Oral Alesha Edouard RN      Heparin Sodium (Porcine) 5000 UNIT/ML injection 5,000 Units     Date Action Dose Maritza Sanchez consulted. 15. Neurointervention Consult- . 14. CTA Head and Neck - QUINN Supraclinoid Aneurysm s/p coiling. 15. Daily TCD's - limited exam today but velocities stable.   1/6  Patient remains stable, CT fails to show any developing hyd monitor Na levels every 6 hours  6. Hypertension  1/10  Neuro:  -MRA shows no vasospasm, Shows neck of aneurysm remnant, needs follow up imaging. -TCDs with no windows.  Will repeat vessel imaging on Friday  -Every hour neuro checks and q2 at night.  -She

## 2017-01-13 ENCOUNTER — APPOINTMENT (OUTPATIENT)
Dept: CT IMAGING | Facility: HOSPITAL | Age: 67
DRG: 021 | End: 2017-01-13
Attending: Other
Payer: MEDICAID

## 2017-01-13 LAB
BASOPHILS # BLD AUTO: 0.07 X10(3) UL (ref 0–0.1)
BASOPHILS NFR BLD AUTO: 0.8 %
BUN BLD-MCNC: 14 MG/DL (ref 8–20)
CALCIUM BLD-MCNC: 8.4 MG/DL (ref 8.3–10.3)
CHLORIDE: 104 MMOL/L (ref 101–111)
CO2: 30 MMOL/L (ref 22–32)
CREAT BLD-MCNC: 0.92 MG/DL (ref 0.55–1.02)
EOSINOPHIL # BLD AUTO: 0.18 X10(3) UL (ref 0–0.3)
EOSINOPHIL NFR BLD AUTO: 2 %
ERYTHROCYTE [DISTWIDTH] IN BLOOD BY AUTOMATED COUNT: 12.2 % (ref 11.5–16)
GLUCOSE BLD-MCNC: 114 MG/DL (ref 70–99)
HCT VFR BLD AUTO: 33.4 % (ref 34–50)
HGB BLD-MCNC: 11.2 G/DL (ref 12–16)
IMMATURE GRANULOCYTE COUNT: 0.08 X10(3) UL (ref 0–1)
IMMATURE GRANULOCYTE RATIO %: 0.9 %
LYMPHOCYTES # BLD AUTO: 2.76 X10(3) UL (ref 0.9–4)
LYMPHOCYTES NFR BLD AUTO: 30.3 %
MCH RBC QN AUTO: 32.4 PG (ref 27–33.2)
MCHC RBC AUTO-ENTMCNC: 33.5 G/DL (ref 31–37)
MCV RBC AUTO: 96.5 FL (ref 81–100)
MONOCYTES # BLD AUTO: 0.85 X10(3) UL (ref 0.1–0.6)
MONOCYTES NFR BLD AUTO: 9.3 %
NEUTROPHIL ABS PRELIM: 5.17 X10 (3) UL (ref 1.3–6.7)
NEUTROPHILS # BLD AUTO: 5.17 X10(3) UL (ref 1.3–6.7)
NEUTROPHILS NFR BLD AUTO: 56.7 %
PLATELET # BLD AUTO: 264 10(3)UL (ref 150–450)
POTASSIUM SERPL-SCNC: 3.5 MMOL/L (ref 3.6–5.1)
POTASSIUM SERPL-SCNC: 3.9 MMOL/L (ref 3.6–5.1)
RBC # BLD AUTO: 3.46 X10(6)UL (ref 3.8–5.1)
RED CELL DISTRIBUTION WIDTH-SD: 43 FL (ref 35.1–46.3)
SODIUM SERPL-SCNC: 141 MMOL/L (ref 136–144)
WBC # BLD AUTO: 9.1 X10(3) UL (ref 4–13)

## 2017-01-13 PROCEDURE — 99232 SBSQ HOSP IP/OBS MODERATE 35: CPT | Performed by: OTHER

## 2017-01-13 PROCEDURE — 70496 CT ANGIOGRAPHY HEAD: CPT

## 2017-01-13 PROCEDURE — 99233 SBSQ HOSP IP/OBS HIGH 50: CPT | Performed by: HOSPITALIST

## 2017-01-13 RX ORDER — POTASSIUM CHLORIDE 20 MEQ/1
40 TABLET, EXTENDED RELEASE ORAL EVERY 4 HOURS
Status: COMPLETED | OUTPATIENT
Start: 2017-01-13 | End: 2017-01-13

## 2017-01-13 NOTE — PROGRESS NOTES
Neurosurgery Progress Note     SUBJECTIVE:  Interval History: stable.      OBJECTIVE:  Vital signs   Temp:  [98.4 °F (36.9 °C)-98.6 °F (37 °C)] 98.6 °F (37 °C)  Pulse:  [66-88] 71  Resp:  [11-21] 12  BP: (103-148)/(66-97) 123/87 mmHg      Intake/Output  Tot

## 2017-01-13 NOTE — PROGRESS NOTES
[facility]  Neuro Critical Care Progress Note    Blanca Quan Patient Status:  Inpatient    1950 MRN [id number]   [facility] 6NE-A Attending Patricia Acevedo MD   Hosp Day # 10 PCP PHYSICIAN NONSTAFF     Subjective:  Sodium stable to suspension 30 mL, 30 mL, Oral, Daily PRN  •  Normal Saline Flush 0.9 % injection 10 mL, 10 mL, Intravenous, PRN  •  Metoclopramide HCl (REGLAN) injection 5 mg, 5 mg, Intravenous, Q6H PRN  •  acetaminophen (TYLENOL) tab 650 mg, 650 mg, Oral, Q6H PRN  •  asp blood pressure goal less than 180 mm Hg. Pulmonary:  -Tolerating room air. Protecting airway. Continue to monitor    Renal:  -Will recheck sodium today. Decrease florinef if stable  -Monitor ins and outs.     GI:  -On diet    Heme/ID:  -Monitor H&H goa

## 2017-01-13 NOTE — CM/SW NOTE
[facility] is ordering nimotop. Pt to fill meds here.    Approval paperwork on pt's chart    Ra Dumont RN/CM  NICU  [id number]/[phone]

## 2017-01-13 NOTE — OCCUPATIONAL THERAPY NOTE
OCCUPATIONAL THERAPY TREATMENT NOTE - INPATIENT     Room Number: 1057/1863-Q  Session: 4  Number of Visits to Meet Established Goals: 5    Presenting Problem: SAH    History related to current admission: Pt is a 61 y.o.  Female admitted 1/3/17 with HA and s 22  Approx Degree of Impairment: 25.8%  Standardized Score (AM-PAC Scale): 47.1  CMS Modifier (G-Code): CJ    FUNCTIONAL TRANSFER ASSESSMENT  Supine to Sit : Modified independent  Sit to Stand: Modified independent    Skilled Therapy Provided: Pt up in the ADLs: independently    Functional Transfer Goals  Patient will perform all functional transfers:  independently    Additional Goals:  Pt will verbalize at least 3 energy conservation techniques  Pt will stand at sink for 5 minutes to complete grooming rout

## 2017-01-13 NOTE — PHYSICAL THERAPY NOTE
PHYSICAL THERAPY TREATMENT NOTE - INPATIENT    Room Number: 9410/6339-I     Session: 5     Number of Visits to Meet Established Goals: 3    Presenting Problem: SAH.  QUINN aneursym s/p coiling 1/3/17    Problem List  Principal Problem:    SAH (subarachnoid None   -   Climbing 3-5 steps with a railing?: None    AM-PAC Score:  Raw Score: 24   PT Approx Degree of Impairment Score: 0%   Standardized Score (AM-PAC Scale): 61.14   CMS Modifier (G-Code): [facility]    FUNCTIONAL ABILITY STATUS  Gait Assessment   Gait Josh PLAN  PT Treatment Plan: Balance training;Transfer training;Stair training;Gait training;Neuromuscular re-educate; Patient education; Family education; Endurance    CURRENT GOALS   Goal #1   Patient is able to demonstrate supine - sit EOB @ level: modifie

## 2017-01-13 NOTE — PROGRESS NOTES
[facility]PITAL[facility]  Progress Note     Leojuan Trujillos Patient Status:  Inpatient    1950 MRN [id number]   [facility] 6NE-A Attending Yulissa Aparicio MD   Hosp Day # 8 PCP PHYSICIAN NONSTAFF     Chief Complaint: syncope    S: Patient reports / PLAN:     1. Acute subarachnoid hemorrhage/R PCA aneurysm  1. S/P angio with coiling 1/3/17  2. Neurology and NS following  3. Nimotop per neuro  4. CTA brain reviewed. 5. 2D echo overall unremarkable  6. TCDs pending  2.  Near syncope with Dizziness/parish

## 2017-01-14 PROCEDURE — 99232 SBSQ HOSP IP/OBS MODERATE 35: CPT | Performed by: HOSPITALIST

## 2017-01-14 PROCEDURE — 99233 SBSQ HOSP IP/OBS HIGH 50: CPT | Performed by: OTHER

## 2017-01-14 NOTE — PLAN OF CARE
Compliant with calling prior to ambulation. Denies dizziness with changes in position. Tyl given for 3/10 headache at 2330. No neuro deficits noted.     NEUROLOGICAL - ADULT    • Achieves stable or improved neurological status Progressing    • Achieves m

## 2017-01-14 NOTE — PROGRESS NOTES
[facility]  Progress Note     Trevor Thao Patient Status:  Inpatient    1950 MRN [id number]   [facility] 6NE-A Attending Flower Jaimes MD   Hosp Day # 6 PCP PHYSICIAN NONSTAFF     Chief Complaint: syncope    S: No acute events following  3. Nimotop per neuro  4. CTA brain reviewed, improvement noted  5. 2D echo overall unremarkable  2. Near syncope with Dizziness/nausea/HA, resolved  3. HTN, controlled  1. Hold home HCTZ, continue Nimotop  4.  Hypernatremia, previously hyponatrem

## 2017-01-14 NOTE — PROGRESS NOTES
[facility]  Neurology  Notes  Affiliated with [address]  2017, 1:54 PM     Hari [facility] Patient Status:  Inpatient    1950 MRN [id number]   [facility] 7NE-A Attending MD Kym Lamas sensory findings  Symmetric DTRs and no upper motor neuron signs  Cerebellar, coordination and gait were normal      A:  1. Aneurysmal SAHform QUINN supraclinoid Aneurysm s/p coiling PBD#10  2. Headache due to # 1 above.   3. HTN      P:   No changes to bobby

## 2017-01-14 NOTE — PROGRESS NOTES
Neurosurgery Progress Note     SUBJECTIVE:  Interval History: . Pt. Denies headache today but occ. Headache. Eating and BM.  Up walking TID    OBJECTIVE:  Vital signs   Temp:  [98 °F (36.7 °C)-99.5 °F (37.5 °C)] 99.5 °F (37.5 °C)  Pulse:  [72-97] 72  Resp:

## 2017-01-14 NOTE — PLAN OF CARE
Assumed care @ 0700. AOx4. Neuros Q4, intact. C/o headache, tylenol given per mar. NSR on tele. Vitals stable. Will continue to monitor.     NEUROLOGICAL - ADULT      •  Achieves stable or improved neurological status  Progressing      •  Achieves max

## 2017-01-15 LAB
ALBUMIN SERPL-MCNC: 3.2 G/DL (ref 3.5–4.8)
ALP LIVER SERPL-CCNC: 43 U/L (ref 50–130)
ALT SERPL-CCNC: 33 U/L (ref 14–54)
AST SERPL-CCNC: 20 U/L (ref 15–41)
BASOPHILS # BLD AUTO: 0.04 X10(3) UL (ref 0–0.1)
BASOPHILS NFR BLD AUTO: 0.4 %
BILIRUB SERPL-MCNC: 0.4 MG/DL (ref 0.1–2)
BUN BLD-MCNC: 11 MG/DL (ref 8–20)
CALCIUM BLD-MCNC: 8.7 MG/DL (ref 8.3–10.3)
CHLORIDE: 105 MMOL/L (ref 101–111)
CO2: 27 MMOL/L (ref 22–32)
CREAT BLD-MCNC: 0.89 MG/DL (ref 0.55–1.02)
EOSINOPHIL # BLD AUTO: 0.38 X10(3) UL (ref 0–0.3)
EOSINOPHIL NFR BLD AUTO: 4 %
ERYTHROCYTE [DISTWIDTH] IN BLOOD BY AUTOMATED COUNT: 12.4 % (ref 11.5–16)
GLUCOSE BLD-MCNC: 113 MG/DL (ref 70–99)
HCT VFR BLD AUTO: 32.8 % (ref 34–50)
HGB BLD-MCNC: 10.8 G/DL (ref 12–16)
IMMATURE GRANULOCYTE COUNT: 0.05 X10(3) UL (ref 0–1)
IMMATURE GRANULOCYTE RATIO %: 0.5 %
LYMPHOCYTES # BLD AUTO: 2.31 X10(3) UL (ref 0.9–4)
LYMPHOCYTES NFR BLD AUTO: 24.6 %
M PROTEIN MFR SERPL ELPH: 6.4 G/DL (ref 6.1–8.3)
MCH RBC QN AUTO: 32.1 PG (ref 27–33.2)
MCHC RBC AUTO-ENTMCNC: 32.9 G/DL (ref 31–37)
MCV RBC AUTO: 97.6 FL (ref 81–100)
MONOCYTES # BLD AUTO: 0.76 X10(3) UL (ref 0.1–0.6)
MONOCYTES NFR BLD AUTO: 8.1 %
NEUTROPHIL ABS PRELIM: 5.85 X10 (3) UL (ref 1.3–6.7)
NEUTROPHILS # BLD AUTO: 5.85 X10(3) UL (ref 1.3–6.7)
NEUTROPHILS NFR BLD AUTO: 62.4 %
PLATELET # BLD AUTO: 283 10(3)UL (ref 150–450)
POTASSIUM SERPL-SCNC: 3.6 MMOL/L (ref 3.6–5.1)
POTASSIUM SERPL-SCNC: 4.5 MMOL/L (ref 3.6–5.1)
RBC # BLD AUTO: 3.36 X10(6)UL (ref 3.8–5.1)
RED CELL DISTRIBUTION WIDTH-SD: 44 FL (ref 35.1–46.3)
SODIUM SERPL-SCNC: 141 MMOL/L (ref 136–144)
WBC # BLD AUTO: 9.4 X10(3) UL (ref 4–13)

## 2017-01-15 PROCEDURE — 99233 SBSQ HOSP IP/OBS HIGH 50: CPT | Performed by: OTHER

## 2017-01-15 PROCEDURE — 99232 SBSQ HOSP IP/OBS MODERATE 35: CPT | Performed by: HOSPITALIST

## 2017-01-15 RX ORDER — POTASSIUM CHLORIDE 20 MEQ/1
40 TABLET, EXTENDED RELEASE ORAL EVERY 4 HOURS
Status: COMPLETED | OUTPATIENT
Start: 2017-01-15 | End: 2017-01-15

## 2017-01-15 NOTE — PROGRESS NOTES
[facility] [address]  Neurology  Notes  Affiliated with [facility]  1/15/2017, 9:30 AM     Oralia Jews Patient Status:  Inpatient    1950 MRN [id number]   [facility] 7NE-A Attending MD Latisha Templeton above  Recent Labs   Lab  01/13/17   0425  01/15/17   0457   RBC  3.46*  3.36*   HGB  11.2*  10.8*   HCT  33.4*  32.8*   MCV  96.5  97.6   MCH  32.4  32.1   MCHC  33.5  32.9   RDW  12.2  12.4   NEPRELIM  5.17  5.85   WBC  9.1  9.4   PLT  264.0  283.0

## 2017-01-15 NOTE — PLAN OF CARE
No neuro deficits noted. Hot pack and tyl for L hip pain and mild headache. Walked halls prior to bed (~300ft). BP within parameters.     Impaired Activities of Daily Living    • Achieve highest/safest level of independence in self care Progressing

## 2017-01-15 NOTE — PROGRESS NOTES
[facility]  Progress Note     Chyrl Head Patient Status:  Inpatient    1950 MRN [id number]   [facility] 6NE-A Attending Roger Arce MD   Hosp Day # 15 PCP PHYSICIAN NONSTAFF     Chief Complaint: syncope    S: No acute events 5,000 Units Subcutaneous Q8H   • sodium chloride  1 g Oral TID CC   • aspirin  81 mg Oral Daily   • famoTIDine  20 mg Oral Daily       ASSESSMENT / PLAN:     1. Acute subarachnoid hemorrhage/R PCA aneurysm  1. S/P angio with coiling 1/3/17  2.  Neurology an

## 2017-01-15 NOTE — PROGRESS NOTES
Neurosurgery Progress Note     SUBJECTIVE:  Interval History: Only mild headache relieved by Tylenol. Neg. Dizziness, N and V. Eating and BM yesterday. Eduarda Riedel Up walking.     OBJECTIVE:  Vital signs   Temp:  [98 °F (36.7 °C)-98.9 °F (37.2 °C)] 98 °F (36.7 °C)  Pu

## 2017-01-16 PROCEDURE — 99232 SBSQ HOSP IP/OBS MODERATE 35: CPT | Performed by: HOSPITALIST

## 2017-01-16 PROCEDURE — 99233 SBSQ HOSP IP/OBS HIGH 50: CPT | Performed by: OTHER

## 2017-01-16 RX ORDER — ACETAMINOPHEN AND CODEINE PHOSPHATE 300; 30 MG/1; MG/1
1 TABLET ORAL EVERY 4 HOURS PRN
Status: DISCONTINUED | OUTPATIENT
Start: 2017-01-16 | End: 2017-01-19

## 2017-01-16 RX ORDER — NIMODIPINE 30 MG/1
30 CAPSULE, LIQUID FILLED ORAL
Qty: 42 CAPSULE | Refills: 0 | Status: SHIPPED | OUTPATIENT
Start: 2017-01-16 | End: 2017-01-23

## 2017-01-16 NOTE — PLAN OF CARE
Steady gait. Neuros intact. VSS. Tyl and hot packs for hip pain. Headaches have resolved.     Impaired Activities of Daily Living    • Achieve highest/safest level of independence in self care Progressing        NEUROLOGICAL - ADULT    • Achieves st

## 2017-01-16 NOTE — CM/SW NOTE
Leobardo Olivares, 01/16/2017, 1:25 PM  Spoke to Mimi Fritz with [facility] regarding nimotop prescription. Paperwork(Auth)  is in the pt. Chart.

## 2017-01-16 NOTE — OCCUPATIONAL THERAPY NOTE
OCCUPATIONAL THERAPY TREATMENT NOTE - INPATIENT     Room Number: 1579/9036-Z  Session: 5  Number of Visits to Meet Established Goals: 5    Presenting Problem: SAH    History related to current admission: Pt is a 61 y.o.  Female admitted 1/3/17 with HA and s Degree of Impairment: 25.8%  Standardized Score (AM-PAC Scale): 47.1  CMS Modifier (G-Code): CJ    FUNCTIONAL TRANSFER ASSESSMENT  Supine to Sit : Modified independent  Sit to Stand: Modified independent    Skilled Therapy Provided: Pt up in the chair for will perform all functional transfers:  independently    Additional Goals:  Pt will verbalize at least 3 energy conservation techniques  Pt will stand at sink for 5 minutes to complete grooming routine

## 2017-01-16 NOTE — PROGRESS NOTES
[id number] [facility] Neu[facility] Progress Note    Gisselle Dominguez Patient Status:  Inpatient    1950 MRN [id number]   [facility] 7NE-A Attending Mer Roche MD   [facility] Day # 15 PCP PHYSICIAN NONSTAFF         Subjective:  Gisselle Dominguez is a 6 from [facility] supraclinoid Aneurysm s/p coiling PBD#13  1. ICH protocol  1. SBP < 180, ha been well-controlled with SBP 120s-140s  2. Nimitop 30mg q 4 hrs  1. SW to help obtain Nimitop outpatient  3. TCDs with limited benefit due to no winows  4.  MRA no vasosp tremor or dysmetria  Romberg: deferred  Gait: deferred    Imaging: no new imaging; prior imaging as noted above       Assessment/Plan:   Aneurysmal SAH, Harley Barron 2,  from R PCOMM aneurysm s/p coiling PBD 13  Headache 2/2 above  HTN  Cerebral salt wasting

## 2017-01-16 NOTE — PROGRESS NOTES
[facility]  Progress Note     Gisselle Dominguez Patient Status:  Inpatient    1950 MRN [id number]   [facility] 6NE-A Attending Corinne Thomas MD   Hosp Day # 15 PCP PHYSICIAN NONSTAFF     Chief Complaint: syncope    S: Pt with no new 1. Acute subarachnoid hemorrhage/R PCA aneurysm  1. S/P angio with coiling 1/3/17  2. Neurology and NS following  3. Nimotop per neuro  4. CTA brain reviewed, improvement noted  5. 2D echo overall unremarkable  2.  Near syncope with Dizziness/nausea/HA,

## 2017-01-16 NOTE — PLAN OF CARE
Patient had good day. No neuro deficits. No headaches. Taking tylenol and using warm packs for bilateral hip pain. ML peripheral IV intact. Appetite good. VSS. Alberto Sebastian out in chair half the day. Dtr and a friend came to visit.

## 2017-01-17 PROCEDURE — 99232 SBSQ HOSP IP/OBS MODERATE 35: CPT | Performed by: HOSPITALIST

## 2017-01-17 PROCEDURE — 99233 SBSQ HOSP IP/OBS HIGH 50: CPT | Performed by: OTHER

## 2017-01-17 NOTE — DISCHARGE SUMMARY
[facility] [place] [facility]OSPITALIST  DISCHARGE SUMMARY     Chyrl Head Patient Status:  Inpatient    1950 MRN [id number]   [facility] 7NE-A Attending Bogdan Bell MD   [address] Day # 12 PCP PHYSICIAN NONSTAFF     Date of Admission: 1/3/2017  Date of Robert Toledo for this. Brief Synopsis: Patient is a 73-year-old female who presented after syncopal episode. She reported frontal headache and CT of the brain showed diffuse subarachnoid hemorrhage, possibility of ruptured aneurysm.   CTA of the brain, neck showing Where to Get Your Medications      Please  your prescriptions at the location directed by your doctor or nurse     Bring a paper prescription for each of these medications    - aspirin 81 MG Tbec  - nimodipine 30 MG Caps          Follow-up appoint

## 2017-01-17 NOTE — PROGRESS NOTES
[facility]  Progress Note     Oralia Ridley Patient Status:  Inpatient    1950 MRN [id number]   [facility] 6NE-A Attending Eliza Peterson MD   Hosp Day # 15 PCP PHYSICIAN NONSTAFF     Chief Complaint: syncope    S: Pt reports mild NS following  3. Nimotop per neuro  4. CTA brain reviewed, improvement noted  5. 2D echo overall unremarkable  2. Near syncope with Dizziness/nausea/HA, resolved  3. HTN, controlled  1. Hold home HCTZ, continue Nimotop  4.  Hypernatremia, previously hyponat

## 2017-01-17 NOTE — PROGRESS NOTES
Neurosurgery Progress Note     SUBJECTIVE:  Interval History: stable.      OBJECTIVE:  Vital signs   Temp:  [97.7 °F (36.5 °C)-98.5 °F (36.9 °C)] 97.9 °F (36.6 °C)  Pulse:  [73-83] 76  Resp:  [16-19] 19  BP: (113-135)/(75-91) 135/91 mmHg      Intake/Output

## 2017-01-17 NOTE — PLAN OF CARE
Walked the halls multiple times during the night. Denies headache. B hip pain persists. Walking and tyl3 helps. Neuros intact.       Impaired Functional Mobility    • Achieve highest/safest level of mobility/gait Progressing        NEUROLOGICAL - ADULT

## 2017-01-17 NOTE — PROGRESS NOTES
[id number] [facility] Progress Note    Blanca Quan Patient Status:  Inpatient    1950 MRN [id number]   [facility] 7NE-A Attending Patricia Acevedo MD   [address] Day # 15 PCP PHYSICIAN NONSTAFF         Subjective:  Blanca Kadeem is a 8 evaluation of the surrounding area.        Assessment/Plan:  1. Aneurysmal SAH from QUINN supraclinoid Aneurysm s/p coiling PBD#14  1. ICH protocol  1. SBP < 180, ha been well-controlled with SBP 120s-140s  2.  Nimitop 30mg q 4 hrs  1. SW to help obtain Nimi intact to light touch throughout   Coord: FNF intact with no tremor or dysmetria  Romberg: deferred  Gait: deferred    Imaging: no new imaging; prior imaging as noted above       Assessment/Plan:   Aneurysmal SAH, Harley Barron 2,  from R PCOMM aneurysm s/p co

## 2017-01-18 LAB
BUN BLD-MCNC: 17 MG/DL (ref 8–20)
CALCIUM BLD-MCNC: 9.3 MG/DL (ref 8.3–10.3)
CHLORIDE: 102 MMOL/L (ref 101–111)
CO2: 26 MMOL/L (ref 22–32)
CREAT BLD-MCNC: 1.02 MG/DL (ref 0.55–1.02)
GLUCOSE BLD-MCNC: 125 MG/DL (ref 70–99)
POTASSIUM SERPL-SCNC: 3.9 MMOL/L (ref 3.6–5.1)
SODIUM SERPL-SCNC: 136 MMOL/L (ref 136–144)

## 2017-01-18 PROCEDURE — 99233 SBSQ HOSP IP/OBS HIGH 50: CPT | Performed by: OTHER

## 2017-01-18 PROCEDURE — 99232 SBSQ HOSP IP/OBS MODERATE 35: CPT | Performed by: INTERNAL MEDICINE

## 2017-01-18 NOTE — PROGRESS NOTES
[id number] [facility] Progress Note    Hiren Roldan Patient Status:  Inpatient    1950 MRN [id number]   [facility] 7NE-A Attending Cyntha Leventhal, MD   Hosp Day # 13 PCP PHYSICIAN NONSTAFF     CC: Headache/SAH    Subjective: is a coil pack with metallic susceptibility artifact measuring 1.1 x 0.9 cm along the medial aspect of the distal right internal carotid artery. At the site of previous aneurysm.  On series 4 image 93 there is flow related enhancement along the anterior asp distress  HEENT: normocephalic  Eyes: sclera anicteric  Heart; normal S1/S2, regular rate and rhythm  Lungs: clear to auscultation bilaterally    Neuro:  Mental status:  Orientation: Alert and oriented to person, place, time  Speech fluent and conversation

## 2017-01-18 NOTE — PROGRESS NOTES
Neurosurgery Progress Note     SUBJECTIVE:  Interval History: stable.      OBJECTIVE:  Vital signs   Temp:  [98.2 °F (36.8 °C)-99.3 °F (37.4 °C)] 98.5 °F (36.9 °C)  Pulse:  [] 85  Resp:  [18] 18  BP: (123-157)/(69-91) 131/83 mmHg      Intake/Output  T

## 2017-01-18 NOTE — PLAN OF CARE
Patient a&o, no neuro deficits, slight headache (3/10) alleviated with tylenol PRN, vss, sr on tele, on room air, c/o mild hip pain also alleviated with PO tylenol. Right PICC line saline locked. Patient up ad lesly. Plan for labs in am. Will monitor.       I

## 2017-01-18 NOTE — PROGRESS NOTES
[facility]  Progress Note     Chyrl Head Patient Status:  Inpatient    1950 MRN [id number]   [facility] 6NE-A Attending Bogdan Bell MD   Hosp Day # 13 PCP PHYSICIAN NONSTAFF     Chief Complaint: syncope    S: Pt reports s improvement noted  5. 2D echo overall unremarkable  2. Near syncope with Dizziness/nausea/HA, resolved  3. Cerebral salt wasting, resolved  4. HTN, controlled  1. Hold home HCTZ, continue Nimotop  5.  Hypernatremia, previously hyponatremia, resolved/stable

## 2017-01-19 ENCOUNTER — APPOINTMENT (OUTPATIENT)
Dept: CT IMAGING | Facility: HOSPITAL | Age: 67
DRG: 021 | End: 2017-01-19
Attending: NEUROLOGICAL SURGERY
Payer: MEDICAID

## 2017-01-19 VITALS
DIASTOLIC BLOOD PRESSURE: 78 MMHG | BODY MASS INDEX: 23.53 KG/M2 | HEART RATE: 80 BPM | RESPIRATION RATE: 17 BRPM | HEIGHT: 64 IN | TEMPERATURE: 98 F | SYSTOLIC BLOOD PRESSURE: 145 MMHG | WEIGHT: 137.81 LBS | OXYGEN SATURATION: 100 %

## 2017-01-19 PROCEDURE — 99239 HOSP IP/OBS DSCHRG MGMT >30: CPT | Performed by: INTERNAL MEDICINE

## 2017-01-19 PROCEDURE — 70450 CT HEAD/BRAIN W/O DYE: CPT

## 2017-01-19 RX ORDER — ASPIRIN 81 MG/1
81 TABLET ORAL DAILY
Qty: 30 TABLET | Refills: 0 | Status: SHIPPED | OUTPATIENT
Start: 2017-01-19 | End: 2017-09-26

## 2017-01-19 RX ORDER — POLYETHYLENE GLYCOL 3350 17 G/17G
17 POWDER, FOR SOLUTION ORAL DAILY
Status: DISCONTINUED | OUTPATIENT
Start: 2017-01-19 | End: 2017-01-19

## 2017-01-19 RX ORDER — DOCUSATE SODIUM 100 MG/1
100 CAPSULE, LIQUID FILLED ORAL 2 TIMES DAILY
Status: DISCONTINUED | OUTPATIENT
Start: 2017-01-19 | End: 2017-01-19

## 2017-01-19 NOTE — PLAN OF CARE
Impaired Functional Mobility    • Achieve highest/safest level of mobility/gait Adequate for Discharge        NEUROLOGICAL - ADULT    • Achieves stable or improved neurological status Adequate for Discharge    • Absence of seizures Adequate for Discharge

## 2017-01-19 NOTE — PAYOR COMM NOTE
Attending Physician: Verna Abreu MD    Review Type: CONTINUED STAY  ReviewerArlo Greenlandic     Date: January 19, 2017 - 1:43 PM  Payor: [address] [place] [address] Number: [id number]  Admit date: 1/3/2017  2:21 PM        REVIEWER COMMENTS    1 tab 20 mg     Date Action Dose Route User    1/19/2017 0851 Given 20 mg Oral Marsha Tanner RN      Heparin Sodium (Porcine) 5000 UNIT/ML injection 5,000 Units     Date Action Dose Route User    1/19/2017 0515 Given 5000 Units Subcutaneous (Right Lower Ab

## 2017-01-19 NOTE — OCCUPATIONAL THERAPY NOTE
Attempted to see pt for OT. Per RN, pt about to leave floor for CT. Will re-attempt to see pt as able.

## 2017-01-19 NOTE — PLAN OF CARE
Assumed care at 0730. A&OX3. Denies dizziness and HA is improved today. C/O mild back and hip pain. Medicated with tylenol with adequate results. Up ambulating in halls today. Anticipating discharge home tomorrow. Friend at bedside.   Resting comforta

## 2017-01-19 NOTE — PLAN OF CARE
Assumed care at 1900. No acute issues overnight. No c/o HA. C/o bilateral hip pain, worse on the left. Prn tylenol for pain. Neurologically intact. NSR on tele. SBP maintained 120-180. Vitals stable.      Impaired Activities of Daily Living    • A

## 2017-01-19 NOTE — PROGRESS NOTES
[facility]  Progress Note     Donnel Mohs Patient Status:  Inpatient    1950 MRN [id number]   [facility] 6NE-A Attending Rupinder Washington MD   Hosp Day # 12 PCP PHYSICIAN NONSTAFF     Chief Complaint: syncope    S: Pt just retu with Dr. Cherelle Guaman  2. Neurology and NS following  3. Nimotop per neuro  4. CTA brain reviewed  5. denies HA for past 2 days. 6. 2D echo overall unremarkable  2. Near syncope with Dizziness/nausea/HA, resolved  3. Cerebral salt wasting, resolved  4.  HTN, c

## 2017-01-19 NOTE — OCCUPATIONAL THERAPY NOTE
OCCUPATIONAL THERAPY TREATMENT NOTE - INPATIENT     Room Number: 6667/7382-A  Session: 6  Number of Visits to Meet Established Goals: 5 update to 6 to meet goals    Presenting Problem: SAH    History related to current admission: Pt is a 61 y.o.  Female adm None    AM-PAC Score:  Score: 24  Approx Degree of Impairment: 0%  Standardized Score (AM-PAC Scale): 57.54  CMS Modifier (G-Code): CH    FUNCTIONAL TRANSFER ASSESSMENT  Supine to Sit : Modified independent  Sit to Stand: Modified independent    Skilled Th

## 2017-01-20 NOTE — CM/SW NOTE
01/20/17 0900   Discharge disposition   Discharged to: Home or Self   Name of Hugo Byrd Provider Home   Outpatient services Physical therapy; Occupational therapy   Home services after discharge None

## 2017-01-25 ENCOUNTER — TELEPHONE (OUTPATIENT)
Dept: SURGERY | Facility: CLINIC | Age: 67
End: 2017-01-25

## 2017-02-02 ENCOUNTER — HOSPITAL ENCOUNTER (OUTPATIENT)
Dept: CT IMAGING | Facility: HOSPITAL | Age: 67
Discharge: HOME | End: 2017-02-02
Attending: NEUROLOGICAL SURGERY
Payer: MEDICAID

## 2017-02-02 DIAGNOSIS — I60.9 SAH (SUBARACHNOID HEMORRHAGE) (HCC): ICD-10-CM

## 2017-02-02 PROCEDURE — 70450 CT HEAD/BRAIN W/O DYE: CPT

## 2017-02-07 NOTE — ED INITIAL ASSESSMENT (HPI)
Patient recently had a coiling done here at [place] [address] on [address] and released on the 16th. Today she experienced blurred vision in the right eye she feels like it cleared up now. Today after eating and changing appointment she became anxious or shaky .  She st

## 2017-02-08 ENCOUNTER — TELEPHONE (OUTPATIENT)
Dept: NEUROLOGY | Facility: CLINIC | Age: 67
End: 2017-02-08

## 2017-02-08 NOTE — ED PROVIDER NOTES
Patient Seen in: [facility] Emergency Department    History   Patient presents with: Anxiety/Panic attack (neurologic)    Stated Complaint: anxious    HPI    60-year-old female presents to the emergency department for feelings of anxiety.   Patient 100 %   O2 Device 02/07/17 1900 None (Room air)       Current:/88 mmHg  Pulse 81  Temp(Src) 98.3 °F (36.8 °C) (Temporal)  Resp 18  Ht 162.6 cm (5' 4\")  Wt 58.968 kg  BMI 22.30 kg/m2  SpO2 100%        Physical Exam   Constitutional: She appears well-

## 2017-02-08 NOTE — ED PROVIDER NOTES
I reviewed that chart and discussed the case.   I have examined the patient and noted alert and oriented patient appears no distress HEENT exam is normal cranial nerves II through XII intact capillary refill less than 2 seconds in all extremities and lungs

## 2017-02-08 NOTE — TELEPHONE ENCOUNTER
Ora Hendrickson called from [address] [place] [address] #[id number] for CT Brain 40398 from 2-7-17 through 2-11-17    Test scheduled for 2-11-17

## 2017-02-10 ENCOUNTER — TELEPHONE (OUTPATIENT)
Dept: SURGERY | Facility: CLINIC | Age: 67
End: 2017-02-10

## 2017-02-10 NOTE — TELEPHONE ENCOUNTER
Pt does not have ride for tomorrow's MRA and wants to reschedule. Instructed pt to call central scheduling and gave her the number.

## 2017-02-13 ENCOUNTER — HOSPITAL ENCOUNTER (OUTPATIENT)
Dept: MRI IMAGING | Facility: HOSPITAL | Age: 67
Discharge: HOME | End: 2017-02-13
Attending: CLINICAL NURSE SPECIALIST
Payer: MEDICAID

## 2017-02-13 DIAGNOSIS — I67.1 BRAIN ANEURYSM: ICD-10-CM

## 2017-02-13 PROCEDURE — 70544 MR ANGIOGRAPHY HEAD W/O DYE: CPT

## 2017-02-21 ENCOUNTER — OFFICE VISIT (OUTPATIENT)
Dept: SURGERY | Facility: CLINIC | Age: 67
End: 2017-02-21

## 2017-02-21 VITALS
WEIGHT: 135 LBS | HEIGHT: 64.5 IN | BODY MASS INDEX: 22.77 KG/M2 | SYSTOLIC BLOOD PRESSURE: 144 MMHG | DIASTOLIC BLOOD PRESSURE: 88 MMHG | HEART RATE: 80 BPM

## 2017-02-21 DIAGNOSIS — I67.1 BRAIN ANEURYSM: Primary | ICD-10-CM

## 2017-02-21 PROCEDURE — 99214 OFFICE O/P EST MOD 30 MIN: CPT | Performed by: RADIOLOGY

## 2017-02-21 RX ORDER — AMLODIPINE BESYLATE 5 MG/1
1 TABLET ORAL DAILY
COMMUNITY
Start: 2017-01-24 | End: 2017-07-26

## 2017-02-21 NOTE — PROGRESS NOTES
2/21/2017      Dear Vasile Alcaraz,  I had the pleasure of seeing Yoav Corral today,2/21/2017   , for her first follow-up status post endovascular coiling of a ruptured right posterior communicating artery aneurysm performed on January 3, 2017.   As you well remem allergies.      Family History   Problem Relation Age of Onset   • Hypertension Mother    • Hypertension Father         Smoking status: Former Smoker 1.00 Packs/Day   Quit date: 07/01/2016   Smokeless tobacco: Not on file        Alcohol Use: Yes 0.0 oz/week full-time, with the one restriction of avoiding any lifting greater than 20 pounds. Her MRS is 0.    2.  With respect to the aneurysm, I have asked her to obtain a repeat MRA in 4–5 months.   If there is  Persistent residual filling of the aneurysm neck, I

## 2017-02-21 NOTE — PROGRESS NOTES
Follow up from aneurysm coiling done on 1/3/2017. Was admitted at that time with [facility] due to ruptured aneurysm. Vision was impaired on right side initially, has recovered 95% of her vision.  Currently has no headaches, some nausea but thinks it was [employer]

## 2017-02-22 ENCOUNTER — TELEPHONE (OUTPATIENT)
Dept: SURGERY | Facility: CLINIC | Age: 67
End: 2017-02-22

## 2017-02-22 NOTE — TELEPHONE ENCOUNTER
Patient asking if she can \"perm\" her hair  Informed her that there is no contraindication  Reminded her that she will need repeat MRA in July 2017  She will call our office closer to that time to get order. No other issues at this time.

## 2017-06-12 ENCOUNTER — TELEPHONE (OUTPATIENT)
Dept: SURGERY | Facility: CLINIC | Age: 67
End: 2017-06-12

## 2017-06-19 ENCOUNTER — TELEPHONE (OUTPATIENT)
Dept: SURGERY | Facility: CLINIC | Age: 67
End: 2017-06-19

## 2017-06-19 DIAGNOSIS — I67.1 CEREBRAL ANEURYSM, NONRUPTURED: Primary | ICD-10-CM

## 2017-06-19 NOTE — TELEPHONE ENCOUNTER
Informed of message below. Pt will contact central scheduling to schedule.  Also, scheduled FU with Dr. Lobo Pearson for 7/18/17

## 2017-06-19 NOTE — TELEPHONE ENCOUNTER
----- Message from Angie Swann RN sent at 2/22/2017 11:28 AM CST -----  Due for MRA of head w/o in July 2017 (Dr. Cormier Model pt)

## 2017-07-10 PROBLEM — I27.20 PULMONARY HTN (HCC): Status: ACTIVE | Noted: 2017-07-10

## 2017-07-26 PROBLEM — Z91.81 AT RISK FOR FALLING: Status: RESOLVED | Noted: 2017-01-03 | Resolved: 2017-07-26

## 2017-07-26 PROBLEM — R40.20 LOC (LOSS OF CONSCIOUSNESS) (HCC): Status: RESOLVED | Noted: 2017-01-03 | Resolved: 2017-07-26

## 2017-07-26 PROBLEM — I60.9 SAH (SUBARACHNOID HEMORRHAGE) (HCC): Status: RESOLVED | Noted: 2017-01-03 | Resolved: 2017-07-26

## 2017-07-26 PROBLEM — Z86.79 HISTORY OF INTRACRANIAL ANEURYSM: Status: ACTIVE | Noted: 2017-07-26

## 2017-07-26 PROBLEM — Z98.890 STATUS POST COIL EMBOLIZATION OF CEREBRAL ANEURYSM: Status: ACTIVE | Noted: 2017-07-26

## 2017-08-08 ENCOUNTER — HOSPITAL ENCOUNTER (OUTPATIENT)
Dept: MRI IMAGING | Facility: HOSPITAL | Age: 67
Discharge: HOME | End: 2017-08-08
Attending: RADIOLOGY
Payer: MEDICARE

## 2017-08-08 ENCOUNTER — OFFICE VISIT (OUTPATIENT)
Dept: SURGERY | Facility: CLINIC | Age: 67
End: 2017-08-08

## 2017-08-08 ENCOUNTER — TELEPHONE (OUTPATIENT)
Dept: SURGERY | Facility: CLINIC | Age: 67
End: 2017-08-08

## 2017-08-08 VITALS
HEART RATE: 64 BPM | SYSTOLIC BLOOD PRESSURE: 120 MMHG | DIASTOLIC BLOOD PRESSURE: 80 MMHG | BODY MASS INDEX: 24.16 KG/M2 | WEIGHT: 145 LBS | HEIGHT: 65 IN

## 2017-08-08 DIAGNOSIS — Z86.79 HISTORY OF INTRACRANIAL ANEURYSM: Primary | ICD-10-CM

## 2017-08-08 DIAGNOSIS — Z01.818 PRE-OP TESTING: ICD-10-CM

## 2017-08-08 DIAGNOSIS — I67.1 CEREBRAL ANEURYSM, NONRUPTURED: ICD-10-CM

## 2017-08-08 DIAGNOSIS — Z98.890 STATUS POST COIL EMBOLIZATION OF CEREBRAL ANEURYSM: ICD-10-CM

## 2017-08-08 PROCEDURE — 99214 OFFICE O/P EST MOD 30 MIN: CPT | Performed by: RADIOLOGY

## 2017-08-08 PROCEDURE — 70544 MR ANGIOGRAPHY HEAD W/O DYE: CPT | Performed by: RADIOLOGY

## 2017-08-08 NOTE — PROGRESS NOTES
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  Disp: 90 tablet Rfl: 3   aspirin 81 MG Oral Tab EC Take 1 tablet (81 mg total) by aman related signal within the superior aspect of the aneurysm which in my estimation has somewhat increased since her prior study and is concerning. ASSESSMENT & PLAN     Francy Chase was seen today for aneurysm.     Diagnoses and all orders for this visit:    Hist

## 2017-08-08 NOTE — PATIENT INSTRUCTIONS
Refill policies:    • Allow 2-3 business days for refills; controlled substances may take longer.   • Contact your pharmacy at least 5 days prior to running out of medication and have them send an electronic request or submit request through the [facility] have a procedure or additional testing performed. [facility]) will contact your insurance carrier to obtain pre-certification or prior authorization.     Unfortunately, [facility] has seen an increase in denial of payment even though the p

## 2017-08-10 RX ORDER — CLOPIDOGREL BISULFATE 75 MG/1
75 TABLET ORAL DAILY
Qty: 30 TABLET | Refills: 3 | Status: ON HOLD | OUTPATIENT
Start: 2017-08-10 | End: 2017-09-26

## 2017-08-10 NOTE — TELEPHONE ENCOUNTER
Patient was informed to contact her PCP to set up appointment for PCP clearance. Patient understood.

## 2017-08-10 NOTE — TELEPHONE ENCOUNTER
Patient scheduled for flow diverting stent across the neck of the aneurysm possibly supplemented with additional coils on 9/25/17 at 11 am.    Discussed aneurysm coiliing with patient     · Nothing to eat or drink after midnight night before procedure  · S

## 2017-08-14 NOTE — TELEPHONE ENCOUNTER
Reviewed the Coiling/stenting instructions with patient for upcoming procedure. Patient was confused about a couple of instructions provided to her. Patient verbalized understanding of all instructions after review of information.  No other issues at this t

## 2017-08-28 ENCOUNTER — TELEPHONE (OUTPATIENT)
Dept: SURGERY | Facility: CLINIC | Age: 67
End: 2017-08-28

## 2017-08-29 NOTE — TELEPHONE ENCOUNTER
Spoke with patient and reviewed procedure instructions. Patient was instructed to call our office back if she has any additional questions and to call our office back if her pharmacy has issues with her plavix script.  Patient understood and was thankful fo

## 2017-08-31 ENCOUNTER — TELEPHONE (OUTPATIENT)
Dept: SURGERY | Facility: CLINIC | Age: 67
End: 2017-08-31

## 2017-08-31 NOTE — TELEPHONE ENCOUNTER
Pt wanting to know if she could have labs done at an offsite [facility] lab. Informed her she could as well as reminding her about need to start Plavix and aspirin 5 days before procedure.  Informed we spoke to pharmacy yesterday and she can go pick that up at

## 2017-09-01 NOTE — TELEPHONE ENCOUNTER
Spoke with patient who had questions regarding labs and where to get them done. Informed patient she has labs and a chest xray that needed to be completed prior to procedure and a few labs that will also need to be completed the morning of.  Patient underst

## 2017-09-11 ENCOUNTER — LAB ENCOUNTER (OUTPATIENT)
Dept: LAB | Age: 67
End: 2017-09-11
Attending: RADIOLOGY
Payer: MEDICARE

## 2017-09-11 ENCOUNTER — HOSPITAL ENCOUNTER (OUTPATIENT)
Dept: GENERAL RADIOLOGY | Age: 67
Discharge: HOME | End: 2017-09-11
Attending: RADIOLOGY
Payer: MEDICARE

## 2017-09-11 DIAGNOSIS — Z01.818 PRE-OP TESTING: ICD-10-CM

## 2017-09-11 DIAGNOSIS — Z98.890 STATUS POST COIL EMBOLIZATION OF CEREBRAL ANEURYSM: ICD-10-CM

## 2017-09-11 DIAGNOSIS — Z86.79 HISTORY OF INTRACRANIAL ANEURYSM: ICD-10-CM

## 2017-09-11 LAB
ALBUMIN SERPL-MCNC: 3.8 G/DL (ref 3.5–4.8)
ALP LIVER SERPL-CCNC: 81 U/L (ref 50–130)
ALT SERPL-CCNC: 39 U/L (ref 14–54)
ANTIBODY SCREEN: NEGATIVE
APTT PPP: 29.4 SECONDS (ref 25–34)
AST SERPL-CCNC: 34 U/L (ref 15–41)
BASOPHILS # BLD AUTO: 0.06 X10(3) UL (ref 0–0.1)
BASOPHILS NFR BLD AUTO: 1.1 %
BILIRUB SERPL-MCNC: 0.6 MG/DL (ref 0.1–2)
BUN BLD-MCNC: 13 MG/DL (ref 8–20)
CALCIUM BLD-MCNC: 9.2 MG/DL (ref 8.3–10.3)
CHLORIDE: 107 MMOL/L (ref 101–111)
CO2: 26 MMOL/L (ref 22–32)
CREAT BLD-MCNC: 1.05 MG/DL (ref 0.55–1.02)
EOSINOPHIL # BLD AUTO: 0.04 X10(3) UL (ref 0–0.3)
EOSINOPHIL NFR BLD AUTO: 0.7 %
ERYTHROCYTE [DISTWIDTH] IN BLOOD BY AUTOMATED COUNT: 12.9 % (ref 11.5–16)
GLUCOSE BLD-MCNC: 86 MG/DL (ref 70–99)
HCT VFR BLD AUTO: 39.5 % (ref 34–50)
HGB BLD-MCNC: 12.5 G/DL (ref 12–16)
IMMATURE GRANULOCYTE COUNT: 0.02 X10(3) UL (ref 0–1)
IMMATURE GRANULOCYTE RATIO %: 0.4 %
INR BLD: 1.05 (ref 0.89–1.11)
LYMPHOCYTES # BLD AUTO: 1.74 X10(3) UL (ref 0.9–4)
LYMPHOCYTES NFR BLD AUTO: 31.5 %
M PROTEIN MFR SERPL ELPH: 7.9 G/DL (ref 6.1–8.3)
MCH RBC QN AUTO: 31.5 PG (ref 27–33.2)
MCHC RBC AUTO-ENTMCNC: 31.6 G/DL (ref 31–37)
MCV RBC AUTO: 99.5 FL (ref 81–100)
MONOCYTES # BLD AUTO: 0.39 X10(3) UL (ref 0.1–0.6)
MONOCYTES NFR BLD AUTO: 7.1 %
NEUTROPHIL ABS PRELIM: 3.27 X10 (3) UL (ref 1.3–6.7)
NEUTROPHILS # BLD AUTO: 3.27 X10(3) UL (ref 1.3–6.7)
NEUTROPHILS NFR BLD AUTO: 59.2 %
PLATELET # BLD AUTO: 229 10(3)UL (ref 150–450)
POTASSIUM SERPL-SCNC: 4.2 MMOL/L (ref 3.6–5.1)
PSA SERPL DL<=0.01 NG/ML-MCNC: 13.7 SECONDS (ref 12–14.3)
RBC # BLD AUTO: 3.97 X10(6)UL (ref 3.8–5.1)
RED CELL DISTRIBUTION WIDTH-SD: 47.2 FL (ref 35.1–46.3)
RH BLOOD TYPE: POSITIVE
SODIUM SERPL-SCNC: 141 MMOL/L (ref 136–144)
WBC # BLD AUTO: 5.5 X10(3) UL (ref 4–13)

## 2017-09-11 PROCEDURE — 80053 COMPREHEN METABOLIC PANEL: CPT | Performed by: RADIOLOGY

## 2017-09-11 PROCEDURE — 36415 COLL VENOUS BLD VENIPUNCTURE: CPT | Performed by: RADIOLOGY

## 2017-09-11 PROCEDURE — 85610 PROTHROMBIN TIME: CPT | Performed by: RADIOLOGY

## 2017-09-11 PROCEDURE — 85025 COMPLETE CBC W/AUTO DIFF WBC: CPT | Performed by: RADIOLOGY

## 2017-09-11 PROCEDURE — 85730 THROMBOPLASTIN TIME PARTIAL: CPT | Performed by: RADIOLOGY

## 2017-09-11 PROCEDURE — 71020 XR CHEST PA + LAT CHEST (CPT=71020): CPT | Performed by: RADIOLOGY

## 2017-09-15 ENCOUNTER — TELEPHONE (OUTPATIENT)
Dept: SURGERY | Facility: CLINIC | Age: 67
End: 2017-09-15

## 2017-09-15 NOTE — TELEPHONE ENCOUNTER
Patient had questions about when she should start her Plavix, informed her to start it on 9/19/2017, the latest 9/20/17. She will be on the Plavix for about 3-6 months post procedure. Informed her to arrive at 9:30 am for 11:00am procedure.  I told her she

## 2017-09-21 ENCOUNTER — TELEPHONE (OUTPATIENT)
Dept: SURGERY | Facility: CLINIC | Age: 67
End: 2017-09-21

## 2017-09-21 NOTE — TELEPHONE ENCOUNTER
Spoke with patient who stated today is her 3rd day taking Plavix and she woke up this morning feeling panicky.  Informed patient that is not a side effect of Plavix and that the main thing to look out for on Plavix is increased bruising/bleeding since Plavi

## 2017-09-24 ENCOUNTER — ANESTHESIA EVENT (OUTPATIENT)
Dept: INTERVENTIONAL RADIOLOGY/VASCULAR | Facility: HOSPITAL | Age: 67
DRG: 027 | End: 2017-09-24
Payer: MEDICARE

## 2017-09-25 ENCOUNTER — ANESTHESIA (OUTPATIENT)
Dept: INTERVENTIONAL RADIOLOGY/VASCULAR | Facility: HOSPITAL | Age: 67
DRG: 027 | End: 2017-09-25
Payer: MEDICARE

## 2017-09-25 ENCOUNTER — HOSPITAL ENCOUNTER (INPATIENT)
Dept: INTERVENTIONAL RADIOLOGY/VASCULAR | Facility: HOSPITAL | Age: 67
LOS: 1 days | Discharge: HOME | DRG: 027 | End: 2017-09-26
Attending: RADIOLOGY | Admitting: RADIOLOGY
Payer: MEDICARE

## 2017-09-25 DIAGNOSIS — Z86.79 HISTORY OF INTRACRANIAL ANEURYSM: ICD-10-CM

## 2017-09-25 DIAGNOSIS — Z01.818 PRE-OP TESTING: ICD-10-CM

## 2017-09-25 DIAGNOSIS — Z98.890 STATUS POST COIL EMBOLIZATION OF CEREBRAL ANEURYSM: ICD-10-CM

## 2017-09-25 LAB
ALBUMIN SERPL-MCNC: 2.9 G/DL (ref 3.5–4.8)
ALP LIVER SERPL-CCNC: 58 U/L (ref 50–130)
ALT SERPL-CCNC: 26 U/L (ref 14–54)
ASPIRIN PLT INHIBITION: 465 ARU (ref 620–672)
ASPIRIN PLT INHIBITION: 556 ARU (ref 620–672)
ASPIRIN PLT INHIBITION: 564 ARU (ref 620–672)
AST SERPL-CCNC: 30 U/L (ref 15–41)
BILIRUB SERPL-MCNC: 0.5 MG/DL (ref 0.1–2)
BUN BLD-MCNC: 10 MG/DL (ref 8–20)
CALCIUM BLD-MCNC: 7.2 MG/DL (ref 8.3–10.3)
CHLORIDE: 114 MMOL/L (ref 101–111)
CO2: 23 MMOL/L (ref 22–32)
CREAT BLD-MCNC: 0.84 MG/DL (ref 0.55–1.02)
GLUCOSE BLD-MCNC: 95 MG/DL (ref 65–99)
GLUCOSE BLD-MCNC: 95 MG/DL (ref 70–99)
ISTAT ACTIVATED CLOTTING TIME: 103 SECONDS (ref 74–137)
ISTAT ACTIVATED CLOTTING TIME: 169 SECONDS (ref 74–137)
ISTAT ACTIVATED CLOTTING TIME: 180 SECONDS (ref 74–137)
ISTAT ACTIVATED CLOTTING TIME: 186 SECONDS (ref 74–137)
ISTAT BLOOD GAS BASE EXCESS: 0 MMOL/L
ISTAT BLOOD GAS HCO3: 25 MEQ/L (ref 22–26)
ISTAT BLOOD GAS O2 SATURATION: 100 % (ref 92–100)
ISTAT BLOOD GAS PCO2: 39 MMHG (ref 35–45)
ISTAT BLOOD GAS PH: 7.42 (ref 7.35–7.45)
ISTAT BLOOD GAS PO2: 466 MMHG (ref 80–105)
ISTAT BLOOD GAS TCO2: 26 MMOL/L (ref 22–32)
ISTAT HEMATOCRIT: 32 % (ref 37–54)
ISTAT IONIZED CALCIUM: 1.05 MMOL/L (ref 1.12–1.32)
ISTAT POTASSIUM: 3.3 MMOL/L (ref 3.6–5.1)
ISTAT SODIUM: 142 MMOL/L (ref 136–144)
M PROTEIN MFR SERPL ELPH: 5.9 G/DL (ref 6.1–8.3)
P2Y12 REACTION UNITS: 176 PRU
P2Y12 REACTION UNITS: 255 PRU
POTASSIUM SERPL-SCNC: 4.1 MMOL/L (ref 3.6–5.1)
SODIUM SERPL-SCNC: 145 MMOL/L (ref 136–144)

## 2017-09-25 PROCEDURE — B313YZZ FLUOROSCOPY OF RIGHT COMMON CAROTID ARTERY USING OTHER CONTRAST: ICD-10-PCS | Performed by: RADIOLOGY

## 2017-09-25 PROCEDURE — B316YZZ FLUOROSCOPY OF RIGHT INTERNAL CAROTID ARTERY USING OTHER CONTRAST: ICD-10-PCS | Performed by: RADIOLOGY

## 2017-09-25 PROCEDURE — 3E033PZ INTRODUCTION OF PLATELET INHIBITOR INTO PERIPHERAL VEIN, PERCUTANEOUS APPROACH: ICD-10-PCS | Performed by: RADIOLOGY

## 2017-09-25 PROCEDURE — 03VK3DZ RESTRICTION OF RIGHT INTERNAL CAROTID ARTERY WITH INTRALUMINAL DEVICE, PERCUTANEOUS APPROACH: ICD-10-PCS | Performed by: RADIOLOGY

## 2017-09-25 PROCEDURE — B31QYZZ FLUOROSCOPY OF CERVICO-CEREBRAL ARCH USING OTHER CONTRAST: ICD-10-PCS | Performed by: RADIOLOGY

## 2017-09-25 PROCEDURE — 99291 CRITICAL CARE FIRST HOUR: CPT | Performed by: OTHER

## 2017-09-25 PROCEDURE — B31RYZZ FLUOROSCOPY OF INTRACRANIAL ARTERIES USING OTHER CONTRAST: ICD-10-PCS | Performed by: RADIOLOGY

## 2017-09-25 RX ORDER — LABETALOL HYDROCHLORIDE 5 MG/ML
10 INJECTION, SOLUTION INTRAVENOUS EVERY 10 MIN PRN
Status: DISCONTINUED | OUTPATIENT
Start: 2017-09-25 | End: 2017-09-26

## 2017-09-25 RX ORDER — HEPARIN SODIUM 5000 [USP'U]/ML
INJECTION, SOLUTION INTRAVENOUS; SUBCUTANEOUS
Status: COMPLETED
Start: 2017-09-25 | End: 2017-09-25

## 2017-09-25 RX ORDER — SODIUM CHLORIDE 9 MG/ML
INJECTION, SOLUTION INTRAVENOUS CONTINUOUS
Status: DISCONTINUED | OUTPATIENT
Start: 2017-09-25 | End: 2017-09-26

## 2017-09-25 RX ORDER — ACETAMINOPHEN 650 MG/1
650 SUPPOSITORY RECTAL EVERY 4 HOURS PRN
Status: DISCONTINUED | OUTPATIENT
Start: 2017-09-25 | End: 2017-09-26

## 2017-09-25 RX ORDER — SODIUM CHLORIDE, SODIUM LACTATE, POTASSIUM CHLORIDE, CALCIUM CHLORIDE 600; 310; 30; 20 MG/100ML; MG/100ML; MG/100ML; MG/100ML
INJECTION, SOLUTION INTRAVENOUS CONTINUOUS
Status: DISCONTINUED | OUTPATIENT
Start: 2017-09-25 | End: 2017-09-25

## 2017-09-25 RX ORDER — DEXAMETHASONE SODIUM PHOSPHATE 4 MG/ML
4 VIAL (ML) INJECTION AS NEEDED
Status: DISCONTINUED | OUTPATIENT
Start: 2017-09-25 | End: 2017-09-25 | Stop reason: HOSPADM

## 2017-09-25 RX ORDER — HYDROMORPHONE HYDROCHLORIDE 1 MG/ML
0.4 INJECTION, SOLUTION INTRAMUSCULAR; INTRAVENOUS; SUBCUTANEOUS EVERY 5 MIN PRN
Status: DISCONTINUED | OUTPATIENT
Start: 2017-09-25 | End: 2017-09-25 | Stop reason: HOSPADM

## 2017-09-25 RX ORDER — ACETAMINOPHEN 325 MG/1
650 TABLET ORAL EVERY 4 HOURS PRN
Status: DISCONTINUED | OUTPATIENT
Start: 2017-09-25 | End: 2017-09-26

## 2017-09-25 RX ORDER — MORPHINE SULFATE 4 MG/ML
2 INJECTION, SOLUTION INTRAMUSCULAR; INTRAVENOUS EVERY 2 HOUR PRN
Status: DISCONTINUED | OUTPATIENT
Start: 2017-09-25 | End: 2017-09-26

## 2017-09-25 RX ORDER — METOCLOPRAMIDE HYDROCHLORIDE 5 MG/ML
10 INJECTION INTRAMUSCULAR; INTRAVENOUS AS NEEDED
Status: DISCONTINUED | OUTPATIENT
Start: 2017-09-25 | End: 2017-09-25 | Stop reason: HOSPADM

## 2017-09-25 RX ORDER — HYDROCODONE BITARTRATE AND ACETAMINOPHEN 5; 325 MG/1; MG/1
1 TABLET ORAL AS NEEDED
Status: DISCONTINUED | OUTPATIENT
Start: 2017-09-25 | End: 2017-09-25 | Stop reason: HOSPADM

## 2017-09-25 RX ORDER — MORPHINE SULFATE 4 MG/ML
1 INJECTION, SOLUTION INTRAMUSCULAR; INTRAVENOUS EVERY 2 HOUR PRN
Status: DISCONTINUED | OUTPATIENT
Start: 2017-09-25 | End: 2017-09-26

## 2017-09-25 RX ORDER — HYDROCODONE BITARTRATE AND ACETAMINOPHEN 5; 325 MG/1; MG/1
2 TABLET ORAL AS NEEDED
Status: DISCONTINUED | OUTPATIENT
Start: 2017-09-25 | End: 2017-09-25 | Stop reason: HOSPADM

## 2017-09-25 RX ORDER — CLOPIDOGREL BISULFATE 75 MG/1
150 TABLET ORAL ONCE
Status: COMPLETED | OUTPATIENT
Start: 2017-09-25 | End: 2017-09-25

## 2017-09-25 RX ORDER — VERAPAMIL HYDROCHLORIDE 2.5 MG/ML
INJECTION, SOLUTION INTRAVENOUS
Status: COMPLETED
Start: 2017-09-25 | End: 2017-09-25

## 2017-09-25 RX ORDER — LIDOCAINE HYDROCHLORIDE 10 MG/ML
INJECTION, SOLUTION INFILTRATION; PERINEURAL
Status: COMPLETED
Start: 2017-09-25 | End: 2017-09-25

## 2017-09-25 RX ORDER — ASPIRIN 325 MG
325 TABLET, DELAYED RELEASE (ENTERIC COATED) ORAL ONCE
Status: COMPLETED | OUTPATIENT
Start: 2017-09-25 | End: 2017-09-25

## 2017-09-25 RX ORDER — ASPIRIN 300 MG
SUPPOSITORY, RECTAL RECTAL
Status: COMPLETED
Start: 2017-09-25 | End: 2017-09-25

## 2017-09-25 RX ORDER — ONDANSETRON 2 MG/ML
4 INJECTION INTRAMUSCULAR; INTRAVENOUS AS NEEDED
Status: DISCONTINUED | OUTPATIENT
Start: 2017-09-25 | End: 2017-09-25 | Stop reason: HOSPADM

## 2017-09-25 RX ORDER — FAMOTIDINE 20 MG/1
20 TABLET ORAL DAILY
Status: DISCONTINUED | OUTPATIENT
Start: 2017-09-25 | End: 2017-09-26

## 2017-09-25 RX ORDER — FAMOTIDINE 10 MG/ML
20 INJECTION, SOLUTION INTRAVENOUS DAILY
Status: DISCONTINUED | OUTPATIENT
Start: 2017-09-25 | End: 2017-09-26

## 2017-09-25 RX ORDER — NALOXONE HYDROCHLORIDE 0.4 MG/ML
80 INJECTION, SOLUTION INTRAMUSCULAR; INTRAVENOUS; SUBCUTANEOUS AS NEEDED
Status: DISCONTINUED | OUTPATIENT
Start: 2017-09-25 | End: 2017-09-25 | Stop reason: HOSPADM

## 2017-09-25 RX ADMIN — SODIUM CHLORIDE: 9 INJECTION, SOLUTION INTRAVENOUS at 21:52:00

## 2017-09-25 RX ADMIN — CLOPIDOGREL BISULFATE 150 MG: 75 TABLET ORAL at 15:52:00

## 2017-09-25 RX ADMIN — ASPIRIN 325 MG: 325 MG TABLET, DELAYED RELEASE (ENTERIC COATED) ORAL at 17:35:00

## 2017-09-25 RX ADMIN — SODIUM CHLORIDE: 9 INJECTION, SOLUTION INTRAVENOUS at 14:30:00

## 2017-09-25 RX ADMIN — FAMOTIDINE 20 MG: 10 INJECTION, SOLUTION INTRAVENOUS at 15:14:00

## 2017-09-25 NOTE — CONSULTS
[facility]  Neurocritical Care       Name: Trevor Thao  MRN: [id number]  Admission Date/Time: 9/25/2017  9:13 AM  Primary Care Provider:  Betito Hendricks MD        Reason for Consultation:   S/p treatment of Pcom aneurysm with pipeline dom BILATERAL      Comment:    9/25/2017: IR ANGIOGRAM CEREBRAL CAROTID UNILATERAL      Comment:    No date: OSTEOPOROSIS ASSESS  2003: OTHER      Comment: partial bowel resection      Prescriptions Prior to Admission:  Clopidogrel Bisulfate (PLAVIX) 75 MG Ora acetaminophen (TYLENOL) 650 MG rectal suppository 650 mg 650 mg Rectal Q4H PRN   morphINE sulfate (PF) 4 MG/ML injection 1 mg 1 mg Intravenous Q2H PRN   Or      morphINE sulfate (PF) 4 MG/ML injection 2 mg 2 mg Intravenous Q2H PRN   Labetalol HCl (TRANDA and no Ptosis. # 5: Facial sensation to temp and light touch normal.   #7: No Facial asymmetry. # 8 Hearing normal.   # 9 & 10: Soft palate elevates normally. # 11: Shoulder shrug is normal and symmetrical.   #12:Tongue is midline.  Power of tongue norm USED: .5 L of heparinized saline flush. Additional 1.5 L of IV fluids. 5 mg intra-arterial verapamil. The total dose of heparin was 6000 units. The baseline ACT was 103. ACT's during treatment ranged from 169-186.  Half of a loading  dose of Integrilin was hemorrhage, death, hair loss, delayed hemorrhage, etc.). The patient expressed an understanding of the procedure and it's risks as well as a willingness to undergo the procedure. The patient was placed on the angiographic table in supine position.   YULIANA reddy level of the neck of the aneurysm measures 3.75 mm as well as measuring 3.75 mm more proximally. Under roadmap visualization, the 7 Western Sybil guide sheath was placed at the level of the proximal right ICA.  The microcatheter and intermediate catheter combinat positioned, bridging the neck of the aneurysm. The visualized parent artery and other branches remain normal.  Fifth control right ICA angiogram:  This was done with temporary apnea after full the placement of the stent.  The stent is well-positioned across Problems:    Status post coil embolization of cerebral aneurysm    S/p Angiogram, s/p Recurrent R Pcom aneurysm treatment with pipeline flow diverting stent.       Plan:  Neuro:  - Neuro checks Q 1hr  - Pain Meds as above  - PT/OT and Speech Therapy when ap

## 2017-09-25 NOTE — PROCEDURES
[facility]  Neurointerventional Surgery Operative Report  Stephanie Patterson Patient Status:  Outpatient in a Bed    1950 MRN [id number]   Location [address] [facility] Attending Aurelio Still MD   Hosp Day # 0 PCP Bradley Hodgson MD

## 2017-09-25 NOTE — ANESTHESIA PREPROCEDURE EVALUATION
PRE-OP EVALUATION    Patient Name: Saida Woods    Pre-op Diagnosis: * No pre-op diagnosis entered *    * No procedures listed *    * No surgeons found in log *    Pre-op vitals reviewed.   Temp: 98 °F (36.7 °C)  Pulse: 69  Resp: 16  BP: 130/80  SpO2: 99 % Alcohol use No       Drug use: No     Available pre-op labs reviewed.     Lab Results  Component Value Date   WBC 5.5 09/11/2017   RBC 3.97 09/11/2017   HGB 12.5 09/11/2017   HCT 39.5 09/11/2017   MCV 99.5 09/11/2017   MCH 31.5 09/11/2017   MCHC 31.6 09/11

## 2017-09-25 NOTE — PROCEDURES
[facility]  Pre-Procedure Note  [facility] Patient Status:  Outpatient in a Bed    1950 MRN [id number]   Location [address] [facility] Attending Alem Monsalve MD   Hosp Day # 0 PCP Nathaniel Lobato MD     Pre-Op Diagnosis:  Prev

## 2017-09-25 NOTE — ANESTHESIA POSTPROCEDURE EVALUATION
[facility] Status:  Outpatient in a Bed   Age/Gender 74year old female MRN [id number]   Location [address] [facility] Attending Nimco Linares MD   [facility] Day # 0 PCP Christian Camacho MD       Anesthesia Post-op Note

## 2017-09-25 NOTE — H&P
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

## 2017-09-26 ENCOUNTER — TELEPHONE (OUTPATIENT)
Dept: SURGERY | Facility: CLINIC | Age: 67
End: 2017-09-26

## 2017-09-26 VITALS
BODY MASS INDEX: 24.21 KG/M2 | TEMPERATURE: 98 F | SYSTOLIC BLOOD PRESSURE: 117 MMHG | HEART RATE: 73 BPM | DIASTOLIC BLOOD PRESSURE: 81 MMHG | HEIGHT: 65 IN | RESPIRATION RATE: 13 BRPM | OXYGEN SATURATION: 100 % | WEIGHT: 145.31 LBS

## 2017-09-26 LAB
BUN BLD-MCNC: 10 MG/DL (ref 8–20)
CALCIUM BLD-MCNC: 8.4 MG/DL (ref 8.3–10.3)
CHLORIDE: 110 MMOL/L (ref 101–111)
CO2: 26 MMOL/L (ref 22–32)
CREAT BLD-MCNC: 0.82 MG/DL (ref 0.55–1.02)
ERYTHROCYTE [DISTWIDTH] IN BLOOD BY AUTOMATED COUNT: 12.8 % (ref 11.5–16)
GLUCOSE BLD-MCNC: 102 MG/DL (ref 70–99)
HCT VFR BLD AUTO: 33.2 % (ref 34–50)
HGB BLD-MCNC: 11 G/DL (ref 12–16)
MCH RBC QN AUTO: 31.6 PG (ref 27–33.2)
MCHC RBC AUTO-ENTMCNC: 33.1 G/DL (ref 31–37)
MCV RBC AUTO: 95.4 FL (ref 81–100)
PLATELET # BLD AUTO: 204 10(3)UL (ref 150–450)
POTASSIUM SERPL-SCNC: 3.8 MMOL/L (ref 3.6–5.1)
RBC # BLD AUTO: 3.48 X10(6)UL (ref 3.8–5.1)
RED CELL DISTRIBUTION WIDTH-SD: 45 FL (ref 35.1–46.3)
SODIUM SERPL-SCNC: 142 MMOL/L (ref 136–144)
WBC # BLD AUTO: 5.6 X10(3) UL (ref 4–13)

## 2017-09-26 PROCEDURE — 99233 SBSQ HOSP IP/OBS HIGH 50: CPT | Performed by: OTHER

## 2017-09-26 RX ORDER — CLOPIDOGREL BISULFATE 75 MG/1
75 TABLET ORAL DAILY
Status: DISCONTINUED | OUTPATIENT
Start: 2017-09-26 | End: 2017-09-26

## 2017-09-26 RX ORDER — ASPIRIN 325 MG
325 TABLET, DELAYED RELEASE (ENTERIC COATED) ORAL DAILY
Status: DISCONTINUED | OUTPATIENT
Start: 2017-09-26 | End: 2017-09-26

## 2017-09-26 RX ORDER — ASPIRIN 325 MG
325 TABLET, DELAYED RELEASE (ENTERIC COATED) ORAL NIGHTLY
Status: DISCONTINUED | OUTPATIENT
Start: 2017-09-26 | End: 2017-09-26

## 2017-09-26 RX ORDER — CLOPIDOGREL BISULFATE 75 MG/1
75 TABLET ORAL EVERY MORNING
Qty: 30 TABLET | Refills: 3 | Status: SHIPPED | OUTPATIENT
Start: 2017-09-26 | End: 2018-01-22

## 2017-09-26 RX ADMIN — FAMOTIDINE 20 MG: 20 TABLET ORAL at 09:49:00

## 2017-09-26 RX ADMIN — CLOPIDOGREL BISULFATE 75 MG: 75 TABLET ORAL at 09:48:00

## 2017-09-26 RX ADMIN — ACETAMINOPHEN 650 MG: 325 TABLET ORAL at 02:31:00

## 2017-09-26 NOTE — PROGRESS NOTES
[facility]  Neurocritical Care       Subjective: Gisselle Dominguez is a(n) 74year old female S/p Angiogram, s/p Recurrent R Pcom aneurysm treatment with pipeline flow diverting stent. no complaints.      Review of Systems    Constitutional: normal and symmetric. 5/5 b/l 2+ b/l. Sensory: Normal and symmetric to light touch. Cerebellar: Finger-nose-finger intact. Gait: Deferred.   Diagnostics:   Xr Chest Pa + Lat Chest (voi=[id number])    Result Date: 9/11/2017  PROCEDURE:  XR CHEST PA + LAT JAMES 300 mg aspirin suppository was given at the termination of the procedure. ANESTHESIA: Gen. anesthesia. The patient's blood pressure, oxygenations and vital signs were monitored by the anesthesiologist for the duration of the procedure.   FLUORO TIME: 13.3 femoral artery was accessed using sterile technique, local anesthesia, general anesthesia, ultrasound and fluoroscopic guidance, with imaging documentation. The procedures listed above were then performed using the devices listed above.   At the termination cavernous right ICA. Right internal carotid angiography: this was obtained in the magnified working oblique projections. The projections separate the aneurysm from the parent artery well on the AP view.  The orthogonal view was chosen for better depiction are normal. There is no evidence for in-stent thrombus formation. 6th control right ICA angiogram: This was performed with less magnification, in the working projections.  The parent artery and visualized distal branches are normal.  Final right ICA contro Medications:     Current Facility-Administered Medications:  Clopidogrel Bisulfate (PLAVIX) tab 75 mg 75 mg Oral Daily   aspirin EC EC tab 325 mg 325 mg Oral Nightly   acetaminophen (TYLENOL) tab 650 mg 650 mg Oral Q4H PRN   Or      acetaminophen (TY to dc home. Thank you for allowing me to participate in the care of this patient. Mily Ray MD  Medical Director - Stroke and Neurocritical Care  [facility] - In affiliation with [facility].   Board Certified in

## 2017-09-26 NOTE — PROGRESS NOTES
[facility]  Interventional Neuroradiology Progress Note    Tana Steward Patient Status:  Outpatient in a Bed    1950 MRN [id number]   [facility] 6NE-A Attending Moshe Sousa MD   Hosp Day # 0 PCP MD Gideon Moya 09/26/2017   CO2 26.0 09/26/2017    09/26/2017   CA 8.4 09/26/2017   ALB 2.9 09/25/2017   ALKPHO 58 09/25/2017   BILT 0.5 09/25/2017   TP 5.9 09/25/2017   AST 30 09/25/2017   ALT 26 09/25/2017       Imaging: no new neuro imaging      Assessment/Plan

## 2017-09-26 NOTE — PLAN OF CARE
Impaired Activities of Daily Living    • Achieve highest/safest level of independence in self care Adequate for Discharge        NEUROLOGICAL - ADULT    • Achieves stable or improved neurological status Adequate for Discharge    • Absence of seizures Adequ

## 2017-09-26 NOTE — OCCUPATIONAL THERAPY NOTE
OCCUPATIONAL THERAPY QUICK EVALUATION - INPATIENT    Room Number: 0339/0584-V  Evaluation Date: 9/26/2017     Type of Evaluation: Initial  Presenting Problem: s/p coil embolization of cerebral aneurysm    Physician Order: IP Consult to Occupational Therapy after January 2017 [facility] admission for burst aneurysm and reports that she was doing well at home. Pt reports initially taking things slowly at home and that she is independent with all ADLs and IADLs.  She reports returning to work in March and r assistance. Pt performed functional mobility with supervision assistance x approx 200ft>lap around unit. Pt performed standing>sit in chair with supervision assist. Pt was educated on safety precautions.  Pt was left in chair with questions answered, needs presents during this admission.     Patient was able to achieve the following goals:  Patient able to toilet transfer: safely and independently  Patient able to dress lower extremities: safely and independently  Patient/Caregiver able to demonstrate safety

## 2017-09-26 NOTE — PHYSICAL THERAPY NOTE
PHYSICAL THERAPY QUICK EVALUATION - INPATIENT    Room Number: 5142/8284-A  Evaluation Date: 9/26/2017  Presenting Problem: s/p Pcom aneurysm with pipline flow diverting stent   Physician Order: PT Eval and Treat    Problem List  Active Problems:    Statu Shin  Coordination - Finger to Nose: Symmetrical  Coordination - Heel to Shin: Symmetrical             AM-PAC '6-Clicks' INPATIENT SHORT FORM - BASIC MOBILITY  How much difficulty does the patient currently have. ..  -   Turning over in bed (including adjus [facility]. Based on this evaluation, patient's clinical presentation is stable and overall evaluation complexity is considered low.   PT Discharge Recommendations: Home    PLAN  Patient has been evaluated and presents with no skilled Physical Therapy needs at

## 2017-09-26 NOTE — PAYOR COMM NOTE
--------------  ADMISSION REVIEW     Payor: [employer] MED[employer] ADV HMO  Subscriber #:  [id number]  Authorization Number: N/A    Admit date: N/A  Admit time: N/A       Admitting Physician: Alesia Arguello MD  Attending Physician:  Alesia Arguello MD  Primary Car changes, or focal weakness. Of note, she reports a panic attack a few days ago. She also had one in January after hospital stay, which was worked up at [facility]. Recommend she discuss with PCP if further panic attacks occur. [AP.3]    Has been NPO since[AP and agree to proceed with plan of care.     Page Adrianna, PA  9/25/2017, 10:02 AM  Spectre [address]10[AP.1]             MEDICATIONS ADMINISTERED IN LAST 1 DAY:  acetaminophen (TYLENOL) tab 650 mg     Date Action Dose Route User    9/26/2017 0231 Given 650 mg 9/25/2017 1800 Rate/Dose Verify (none) Intravenous Shlomo Weller RN    9/25/2017 1600 Rate/Dose Verify (none) Intravenous Shlomo Weller RN    9/25/2017 1500 Rate/Dose Verify (none) Intravenous Shlomo Weller RN    9/25/2017 1430 New Bag (none) Intr

## 2017-09-28 ENCOUNTER — APPOINTMENT (OUTPATIENT)
Dept: LAB | Age: 67
End: 2017-09-28
Attending: PHYSICIAN ASSISTANT
Payer: MEDICARE

## 2017-09-28 DIAGNOSIS — Z98.890 STATUS POST COIL EMBOLIZATION OF CEREBRAL ANEURYSM: ICD-10-CM

## 2017-09-28 PROCEDURE — 36415 COLL VENOUS BLD VENIPUNCTURE: CPT

## 2017-09-28 PROCEDURE — 85576 BLOOD PLATELET AGGREGATION: CPT

## 2017-09-28 NOTE — TELEPHONE ENCOUNTER
[facility]2017. Talked to Otilia Hurst  with [employer].   Rep indicated that after 10/1/2017 patient would not be eligible for continuity of care that is only offered by insurance company the 1st 90 days the c

## 2017-10-02 PROBLEM — Z78.9: Status: ACTIVE | Noted: 2017-10-02

## 2017-10-02 PROBLEM — Z28.21 INFLUENZA VACCINATION DECLINED: Status: ACTIVE | Noted: 2017-10-02

## 2017-10-09 ENCOUNTER — OFFICE VISIT (OUTPATIENT)
Dept: SURGERY | Facility: CLINIC | Age: 67
End: 2017-10-09

## 2017-10-09 VITALS — DIASTOLIC BLOOD PRESSURE: 70 MMHG | HEART RATE: 68 BPM | SYSTOLIC BLOOD PRESSURE: 130 MMHG

## 2017-10-09 DIAGNOSIS — Z98.890 STATUS POST COIL EMBOLIZATION OF CEREBRAL ANEURYSM: Primary | ICD-10-CM

## 2017-10-09 PROCEDURE — 99214 OFFICE O/P EST MOD 30 MIN: CPT | Performed by: RADIOLOGY

## 2017-10-09 NOTE — PROGRESS NOTES
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   As you well remember, [facility] reported  Neck: no symptoms reported  Nose: no symptoms reported  Psychiatric: no symptoms reported    UPDATED MEDICAL/SURGICAL HISTORY     See above.   Past Medical History:   Diagnosis Date   • Bowel obstruction 2017   • Brain aneurysm 2017   • Essential fluent, and her recent and remote memory are intact.   HEENT: Head normal. Ears normal. Eyes normal. Nose normal. Throat normal.  Neck/Thyroid: no JVD, adenopathy or bruit, trachea midline  Lungs: clear, equal bilaterally, no wheezing  Cardiovascular: regul

## 2017-10-09 NOTE — PATIENT INSTRUCTIONS
Refill policies:    • Allow 2-3 business days for refills; controlled substances may take longer.   • Contact your pharmacy at least 5 days prior to running out of medication and have them send an electronic request or submit request through the [facility] have a procedure or additional testing performed. [facility]) will contact your insurance carrier to obtain pre-certification or prior authorization.     Unfortunately, [facility] has seen an increase in denial of payment even though the p

## 2017-10-09 NOTE — PROGRESS NOTES
9/25/2017 cervicocerebral angiography and treatment of recurrent right Pcom aneurysm with Pipeline embolization device.     Review of Systems:    Hand Dominance: right  General: no symptoms reported  Neuro: no symptoms reported  Head: no symptoms reported

## 2017-10-20 ENCOUNTER — TELEPHONE (OUTPATIENT)
Dept: SURGERY | Facility: CLINIC | Age: 67
End: 2017-10-20

## 2017-10-20 NOTE — TELEPHONE ENCOUNTER
Patient is going to be having imaging done at [facility]. Nothing further needed.

## 2017-10-23 ENCOUNTER — TELEPHONE (OUTPATIENT)
Dept: SURGERY | Facility: CLINIC | Age: 67
End: 2017-10-23

## 2017-11-02 NOTE — TELEPHONE ENCOUNTER
Attempted to call [facility]. Dental Clinic, unable to speak to a representative in order to request fax #. Called pt, informed her letter was prepared but we do not have # to fax it to, she requests we send the letter to her home address via mail.   Let

## 2017-11-29 ENCOUNTER — TELEPHONE (OUTPATIENT)
Dept: SURGERY | Facility: CLINIC | Age: 67
End: 2017-11-29

## 2017-12-03 NOTE — PROGRESS NOTES
Echocardiogram performed bedside.   Neurosurgery Progress Note     SUBJECTIVE:  Interval History: stable.      OBJECTIVE:  Vital signs   Temp:  [98 °F (36.7 °C)-98.8 °F (37.1 °C)] 98.8 °F (37.1 °C)  Pulse:  [67-77] 72  Resp:  [18-20] 18  BP: (123-143)/(72-81) 123/78 mmHg      Intake/Output  T

## 2017-12-05 ENCOUNTER — LAB ENCOUNTER (OUTPATIENT)
Dept: LAB | Age: 67
End: 2017-12-05
Attending: FAMILY MEDICINE
Payer: MEDICARE

## 2017-12-05 DIAGNOSIS — E87.8 ELECTROLYTE ABNORMALITY: ICD-10-CM

## 2017-12-05 PROCEDURE — 80048 BASIC METABOLIC PNL TOTAL CA: CPT

## 2017-12-05 PROCEDURE — 36415 COLL VENOUS BLD VENIPUNCTURE: CPT

## 2017-12-05 NOTE — PROGRESS NOTES
Recheck of electrolytes shows normal sodium and potassium. There was increase in creatinine which could indicate that you were not well-hydrated. However, I would like to recheck you in the office in the near future.   Please be sure to stay well-hydrated

## 2018-01-09 ENCOUNTER — HOSPITAL ENCOUNTER (OUTPATIENT)
Dept: MRI IMAGING | Facility: HOSPITAL | Age: 68
Discharge: HOME | End: 2018-01-09
Attending: RADIOLOGY
Payer: MEDICARE

## 2018-01-09 DIAGNOSIS — I27.20 PULMONARY HTN (HCC): ICD-10-CM

## 2018-01-09 DIAGNOSIS — Z98.890 STATUS POST COIL EMBOLIZATION OF CEREBRAL ANEURYSM: ICD-10-CM

## 2018-01-09 DIAGNOSIS — I10 ESSENTIAL HYPERTENSION: ICD-10-CM

## 2018-01-09 DIAGNOSIS — Z86.79 HISTORY OF INTRACRANIAL ANEURYSM: ICD-10-CM

## 2018-01-09 PROCEDURE — 70551 MRI BRAIN STEM W/O DYE: CPT | Performed by: RADIOLOGY

## 2018-01-09 PROCEDURE — A9575 INJ GADOTERATE MEGLUMI 0.1ML: HCPCS | Performed by: RADIOLOGY

## 2018-01-09 PROCEDURE — 70546 MR ANGIOGRAPH HEAD W/O&W/DYE: CPT | Performed by: RADIOLOGY

## 2018-01-22 ENCOUNTER — OFFICE VISIT (OUTPATIENT)
Dept: SURGERY | Facility: CLINIC | Age: 68
End: 2018-01-22

## 2018-01-22 VITALS — DIASTOLIC BLOOD PRESSURE: 84 MMHG | SYSTOLIC BLOOD PRESSURE: 120 MMHG | HEART RATE: 68 BPM

## 2018-01-22 DIAGNOSIS — Z86.79 HISTORY OF INTRACRANIAL ANEURYSM: Primary | ICD-10-CM

## 2018-01-22 DIAGNOSIS — Z98.890 STATUS POST COIL EMBOLIZATION OF CEREBRAL ANEURYSM: ICD-10-CM

## 2018-01-22 PROCEDURE — 99214 OFFICE O/P EST MOD 30 MIN: CPT | Performed by: RADIOLOGY

## 2018-01-22 NOTE — PROGRESS NOTES
1/22/2018      Dear Mejia Goldsmith,  I had the pleasure of seeing Dionisio Calloway today,1/22/2018   , for a four-month follow-up after retreatment of the previously ruptured 13 mm right superior hypophyseal aneurysm on September 25, 2017.   As you well remember, Franc Bill history.   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  Cranial nerves:Cranial nerves II-XII exam

## 2018-01-22 NOTE — PATIENT INSTRUCTIONS
Refill policies:    • Allow 2-3 business days for refills; controlled substances may take longer.   • Contact your pharmacy at least 5 days prior to running out of medication and have them send an electronic request or submit request through the [facility] recommended that you have a procedure or additional testing performed. [facility]) will contact your insurance carrier to obtain pre-certification or prior authorization.     Unfortunately, [facility] has seen an increase in denial of paym

## 2018-01-22 NOTE — PROGRESS NOTES
Review of Systems:    Hand Dominance: right  General: no symptoms reported  Neuro: no symptoms reported  Head: headache  Musculoskeletal: no symptoms reported  Cardiovascular: no symptoms reported  Gastrointestinal: changes in bowel habits  Genitourinary:

## 2018-01-24 DIAGNOSIS — Z01.818 PRE-OP TESTING: ICD-10-CM

## 2018-01-24 DIAGNOSIS — Z86.79 HISTORY OF INTRACRANIAL ANEURYSM: ICD-10-CM

## 2018-01-24 DIAGNOSIS — Z98.890 STATUS POST COIL EMBOLIZATION OF CEREBRAL ANEURYSM: ICD-10-CM

## 2018-01-24 RX ORDER — CLOPIDOGREL BISULFATE 75 MG/1
TABLET ORAL
Qty: 30 TABLET | Refills: 0 | OUTPATIENT
Start: 2018-01-24

## 2018-08-02 PROCEDURE — 81003 URINALYSIS AUTO W/O SCOPE: CPT | Performed by: FAMILY MEDICINE

## 2019-02-28 PROBLEM — N18.30 CKD (CHRONIC KIDNEY DISEASE) STAGE 3, GFR 30-59 ML/MIN (HCC): Status: ACTIVE | Noted: 2019-02-28

## 2019-09-11 PROBLEM — I77.3 FIBROMUSCULAR DYSPLASIA (HCC): Status: ACTIVE | Noted: 2019-09-11

## 2019-09-11 PROBLEM — I77.3 FIBROMUSCULAR DYSPLASIA: Status: ACTIVE | Noted: 2019-09-11

## 2021-05-26 NOTE — LETTER
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  Disp: 90 tablet Rfl: 3   aspirin 81 MG Oral Tab EC Take 1 tablet (81 mg total) by demonstrates definite flow related signal within the superior aspect of the aneurysm which in my estimation has somewhat increased since her prior study and is concerning. ASSESSMENT & PLAN     Marion Dus was seen today for aneurysm.     Diagnoses and all ord Concentration Of Solution Injected (Mg/Ml): 10.0

## 2022-01-20 ENCOUNTER — LAB ENCOUNTER (OUTPATIENT)
Dept: LAB | Age: 72
End: 2022-01-20
Attending: FAMILY MEDICINE
Payer: MEDICARE

## 2022-01-20 ENCOUNTER — OFFICE VISIT (OUTPATIENT)
Dept: FAMILY MEDICINE CLINIC | Facility: CLINIC | Age: 72
End: 2022-01-20
Payer: MEDICARE

## 2022-01-20 VITALS
TEMPERATURE: 98 F | SYSTOLIC BLOOD PRESSURE: 166 MMHG | BODY MASS INDEX: 27.66 KG/M2 | HEART RATE: 84 BPM | RESPIRATION RATE: 16 BRPM | WEIGHT: 166 LBS | DIASTOLIC BLOOD PRESSURE: 100 MMHG | HEIGHT: 65 IN

## 2022-01-20 DIAGNOSIS — Z78.0 POSTMENOPAUSAL: ICD-10-CM

## 2022-01-20 DIAGNOSIS — Z12.31 ENCOUNTER FOR SCREENING MAMMOGRAM FOR MALIGNANT NEOPLASM OF BREAST: ICD-10-CM

## 2022-01-20 DIAGNOSIS — R09.82 POSTNASAL DRIP: ICD-10-CM

## 2022-01-20 DIAGNOSIS — Z13.820 OSTEOPOROSIS SCREENING: ICD-10-CM

## 2022-01-20 DIAGNOSIS — N18.31 STAGE 3A CHRONIC KIDNEY DISEASE (HCC): ICD-10-CM

## 2022-01-20 DIAGNOSIS — I10 ESSENTIAL HYPERTENSION: Primary | ICD-10-CM

## 2022-01-20 LAB
ALBUMIN SERPL-MCNC: 3.8 G/DL (ref 3.4–5)
ALBUMIN/GLOB SERPL: 1 {RATIO} (ref 1–2)
ALP LIVER SERPL-CCNC: 86 U/L
ALT SERPL-CCNC: 42 U/L
ANION GAP SERPL CALC-SCNC: 6 MMOL/L (ref 0–18)
AST SERPL-CCNC: 34 U/L (ref 15–37)
BILIRUB SERPL-MCNC: 0.7 MG/DL (ref 0.1–2)
BUN BLD-MCNC: 11 MG/DL (ref 7–18)
CALCIUM BLD-MCNC: 9.2 MG/DL (ref 8.5–10.1)
CHLORIDE SERPL-SCNC: 107 MMOL/L (ref 98–112)
CHOLEST SERPL-MCNC: 170 MG/DL (ref ?–200)
CO2 SERPL-SCNC: 28 MMOL/L (ref 21–32)
CREAT BLD-MCNC: 1.13 MG/DL
FASTING PATIENT LIPID ANSWER: YES
FASTING STATUS PATIENT QL REPORTED: YES
GLOBULIN PLAS-MCNC: 3.8 G/DL (ref 2.8–4.4)
GLUCOSE BLD-MCNC: 100 MG/DL (ref 70–99)
HDLC SERPL-MCNC: 69 MG/DL (ref 40–59)
LDLC SERPL CALC-MCNC: 89 MG/DL (ref ?–100)
NONHDLC SERPL-MCNC: 101 MG/DL (ref ?–130)
OSMOLALITY SERPL CALC.SUM OF ELEC: 291 MOSM/KG (ref 275–295)
POTASSIUM SERPL-SCNC: 4.4 MMOL/L (ref 3.5–5.1)
PROT SERPL-MCNC: 7.6 G/DL (ref 6.4–8.2)
SODIUM SERPL-SCNC: 141 MMOL/L (ref 136–145)
TRIGL SERPL-MCNC: 61 MG/DL (ref 30–149)
VLDLC SERPL CALC-MCNC: 10 MG/DL (ref 0–30)

## 2022-01-20 PROCEDURE — 99203 OFFICE O/P NEW LOW 30 MIN: CPT | Performed by: FAMILY MEDICINE

## 2022-01-20 PROCEDURE — 80061 LIPID PANEL: CPT | Performed by: FAMILY MEDICINE

## 2022-01-20 PROCEDURE — 3080F DIAST BP >= 90 MM HG: CPT | Performed by: FAMILY MEDICINE

## 2022-01-20 PROCEDURE — 3008F BODY MASS INDEX DOCD: CPT | Performed by: FAMILY MEDICINE

## 2022-01-20 PROCEDURE — 80053 COMPREHEN METABOLIC PANEL: CPT | Performed by: FAMILY MEDICINE

## 2022-01-20 PROCEDURE — 3077F SYST BP >= 140 MM HG: CPT | Performed by: FAMILY MEDICINE

## 2022-01-20 RX ORDER — FLUTICASONE PROPIONATE 50 MCG
2 SPRAY, SUSPENSION (ML) NASAL DAILY
Qty: 3 EACH | Refills: 0 | Status: SHIPPED | OUTPATIENT
Start: 2022-01-20

## 2022-01-20 NOTE — PROGRESS NOTES
[address] [facility] Family Medicine Office Note  Chief Complaint:   Patient presents with:  Establish Care      HPI:   This is a 73year old female coming in for establishment of care for:    1. HTN - Patient presents for recheck of her hypertension.  Pt Allergies  Current Meds:  Current Outpatient Medications   Medication Sig Dispense Refill   • fluticasone propionate 50 MCG/ACT Nasal Suspension 2 sprays by Each Nare route daily.  3 each 0   • amLODIPine Besylate 5 MG Oral Tab Take 1 tablet (5 mg total) by Height as of this encounter: 5' 5\" (1.651 m). Weight as of this encounter: 166 lb (75.3 kg). Vital signs reviewed. Appears stated age, well groomed.   Physical Exam:  GEN:  Patient is alert and oriented x3, no apparent distress  HEAD:  Normocephalic, a Prescriptions Disp Refills   • fluticasone propionate 50 MCG/ACT Nasal Suspension 3 each 0     Si sprays by Each Nare route daily.        Health Maintenance:  COVID-19 Vaccine(1) Never done  FIT/FOBT Colorectal Screening Never done  Zoster Vaccines(1 of

## 2022-01-20 NOTE — PATIENT INSTRUCTIONS
Prevention Guidelines, Women Ages 72 and Older  Screening tests and vaccines are an important part of managing your health. A screening test is done to find possible disorders or diseases in people who don't have any symptoms.  The goal is to find a dis Multiple tests are available and are used at different times.  Possible tests include:  · Flexible sigmoidoscopy every 5 years, or  · Colonoscopy every 10 years, or  · CT colonography (virtual colonoscopy) every 5 years, or  · Yearly fecal occult blood test hormone (TSH) Only women in this age group with symptoms of thyroid dysfunction Talk with your healthcare provider   Tuberculosis Women at increased risk for infection Talk with your healthcare provider   Vision All women in this age group Every 1 to 2 yea the first. This is given even if you've had shingles before or had a previous zoster live vaccine. · Zoster live vaccine live (ZVL; Zostavax) may be given to healthy adults over age 61. It's given in one dose.    Counseling Who needs it How often   Diet an

## 2022-01-21 RX ORDER — LISINOPRIL 10 MG/1
10 TABLET ORAL DAILY
Qty: 90 TABLET | Refills: 0 | Status: SHIPPED | OUTPATIENT
Start: 2022-01-21 | End: 2022-01-26

## 2022-01-26 ENCOUNTER — OFFICE VISIT (OUTPATIENT)
Dept: FAMILY MEDICINE CLINIC | Facility: CLINIC | Age: 72
End: 2022-01-26
Payer: MEDICARE

## 2022-01-26 VITALS
DIASTOLIC BLOOD PRESSURE: 98 MMHG | HEART RATE: 84 BPM | RESPIRATION RATE: 16 BRPM | HEIGHT: 65 IN | BODY MASS INDEX: 27.66 KG/M2 | TEMPERATURE: 98 F | WEIGHT: 166 LBS | SYSTOLIC BLOOD PRESSURE: 148 MMHG

## 2022-01-26 DIAGNOSIS — R09.82 POSTNASAL DRIP: ICD-10-CM

## 2022-01-26 DIAGNOSIS — I10 ESSENTIAL HYPERTENSION: Primary | ICD-10-CM

## 2022-01-26 PROCEDURE — 3080F DIAST BP >= 90 MM HG: CPT | Performed by: FAMILY MEDICINE

## 2022-01-26 PROCEDURE — 3008F BODY MASS INDEX DOCD: CPT | Performed by: FAMILY MEDICINE

## 2022-01-26 PROCEDURE — 3077F SYST BP >= 140 MM HG: CPT | Performed by: FAMILY MEDICINE

## 2022-01-26 PROCEDURE — 99214 OFFICE O/P EST MOD 30 MIN: CPT | Performed by: FAMILY MEDICINE

## 2022-01-26 RX ORDER — AMOXICILLIN AND CLAVULANATE POTASSIUM 875; 125 MG/1; MG/1
1 TABLET, FILM COATED ORAL 2 TIMES DAILY
Qty: 20 TABLET | Refills: 0 | Status: SHIPPED | OUTPATIENT
Start: 2022-01-26 | End: 2022-02-05

## 2022-01-26 RX ORDER — LOSARTAN POTASSIUM 50 MG/1
50 TABLET ORAL DAILY
Qty: 30 TABLET | Refills: 0 | Status: SHIPPED | OUTPATIENT
Start: 2022-01-26

## 2022-01-26 NOTE — PROGRESS NOTES
[facility] Family Medicine Office Note  Chief Complaint:   Patient presents with:  Consult: has questions/concerns about rx for bp  Sinus Problem: sts she has sinus infection, hx of this, wonders if she needs to be abx, feels flonase worked some History:  Family History   Problem Relation Age of Onset   • Hypertension Mother    • Hypertension Father    • Heart Attack Father 62     Allergies:  No Known Allergies  Current Meds:  Current Outpatient Medications   Medication Sig Dispense Refill   • los °C)   Resp 16   Ht 5' 5\" (1.651 m)   Wt 166 lb (75.3 kg)   LMP 01/08/2001   BMI 27.62 kg/m²  Estimated body mass index is 27.62 kg/m² as calculated from the following:    Height as of this encounter: 5' 5\" (1.651 m).     Weight as of this encounter: 166 l 04/03/2020  Influenza Vaccine(1) Never done    Patient/Caregiver Education: Patient/Caregiver Education: There are no barriers to learning. Medical education done. Outcome: Patient verbalizes understanding.  Patient is notified to call with any questions,

## 2022-02-02 ENCOUNTER — TELEPHONE (OUTPATIENT)
Dept: FAMILY MEDICINE CLINIC | Facility: CLINIC | Age: 72
End: 2022-02-02

## 2022-02-02 NOTE — TELEPHONE ENCOUNTER
Call to Mani Toney. Advised her to f/u with her dentists ofc to inquire on their protocol for seeing pts in ofc with URI/sinus symptoms. She is agreeable. No additional questions at this time.

## 2022-02-02 NOTE — TELEPHONE ENCOUNTER
Pt currently has a sinus infection and on antibiotic. Asking if it is okay for her to go to the dentist for a chipped tooth. Pls advise.

## 2022-02-09 ENCOUNTER — OFFICE VISIT (OUTPATIENT)
Dept: FAMILY MEDICINE CLINIC | Facility: CLINIC | Age: 72
End: 2022-02-09
Payer: MEDICARE

## 2022-02-09 VITALS
BODY MASS INDEX: 27.66 KG/M2 | DIASTOLIC BLOOD PRESSURE: 90 MMHG | SYSTOLIC BLOOD PRESSURE: 140 MMHG | WEIGHT: 166 LBS | RESPIRATION RATE: 16 BRPM | HEART RATE: 76 BPM | TEMPERATURE: 98 F | HEIGHT: 65 IN

## 2022-02-09 DIAGNOSIS — I10 ESSENTIAL HYPERTENSION: Primary | ICD-10-CM

## 2022-02-09 PROCEDURE — 3077F SYST BP >= 140 MM HG: CPT | Performed by: FAMILY MEDICINE

## 2022-02-09 PROCEDURE — 3080F DIAST BP >= 90 MM HG: CPT | Performed by: FAMILY MEDICINE

## 2022-02-09 PROCEDURE — 3008F BODY MASS INDEX DOCD: CPT | Performed by: FAMILY MEDICINE

## 2022-02-09 PROCEDURE — 99214 OFFICE O/P EST MOD 30 MIN: CPT | Performed by: FAMILY MEDICINE

## 2022-02-09 RX ORDER — LOSARTAN POTASSIUM 100 MG/1
100 TABLET ORAL DAILY
Qty: 90 TABLET | Refills: 0 | Status: SHIPPED | OUTPATIENT
Start: 2022-02-09 | End: 2022-02-16 | Stop reason: DRUGHIGH

## 2022-02-16 ENCOUNTER — TELEPHONE (OUTPATIENT)
Dept: FAMILY MEDICINE CLINIC | Facility: CLINIC | Age: 72
End: 2022-02-16

## 2022-02-16 RX ORDER — LOSARTAN POTASSIUM 50 MG/1
50 TABLET ORAL DAILY
Qty: 90 TABLET | Refills: 0 | Status: SHIPPED | OUTPATIENT
Start: 2022-02-16

## 2022-02-16 NOTE — TELEPHONE ENCOUNTER
Pt has a question regarding her BP medication. losartan 100 MG Oral Tab    Pt states her dosage was Increased from 50 mgs to 100 mgs. Pt states her BP is below the numbers established for her. Pt has concerns regarding increasing her dosage. Pt asking if she can stay on the 50 mg dosage?

## 2022-02-16 NOTE — TELEPHONE ENCOUNTER
S/w pt. She sts she has not yet begun the higher dose of Losartan (100mg daily). Still taking her old dose of Losartan 50mg daily. Has been monitoring her BP:  taken around 6 PM each day  Currently takes the Losartan in the morning    Saturday 2/12:  132/78  Sunday 2/13:   139/78  Monday 2/14:   137/81  Tuesday 2/15:   138/80  Wednesday 2/16 (taken ~12PM):   135/86    Asking if she can stay on the 50mg dose of Losartan, as she sts she does not want to be on more medication if she does not have to be if her \"blood pressure is okay\". Please advise.

## 2022-02-16 NOTE — TELEPHONE ENCOUNTER
Pt called back. Advised of below. She voiced understanding. Asking for new rx to [facility] as she is not sure if she can score the 100mg. I have sent for her. Med list updated.

## 2022-05-26 ENCOUNTER — PATIENT OUTREACH (OUTPATIENT)
Dept: FAMILY MEDICINE CLINIC | Facility: CLINIC | Age: 72
End: 2022-05-26

## 2022-05-26 NOTE — PROGRESS NOTES
[facility] sent to the pt to call and schedule an appt to review and evaluate her blood pressure.

## 2022-08-19 ENCOUNTER — TELEPHONE (OUTPATIENT)
Dept: FAMILY MEDICINE CLINIC | Facility: CLINIC | Age: 72
End: 2022-08-19

## 2023-05-25 ENCOUNTER — TELEPHONE (OUTPATIENT)
Dept: FAMILY MEDICINE CLINIC | Facility: CLINIC | Age: 73
End: 2023-05-25

## 2023-07-28 ENCOUNTER — TELEPHONE (OUTPATIENT)
Dept: FAMILY MEDICINE CLINIC | Facility: CLINIC | Age: 73
End: 2023-07-28

## 2023-10-26 NOTE — PATIENT INSTRUCTIONS
Refill policies:    • Allow 2 business days for refills; controlled substances may take longer.   • Contact your pharmacy at least 5 days prior to running out of medication and have them send an electronic request or submit request through the “request re your physician has recommended that you have a procedure or additional testing performed. [facility] will contact your insurance carrier to obtain pre-certification or prior authorization.     Unfortunately, CLAUDY has seen an increas 1 person assist

## 2023-11-16 ENCOUNTER — PATIENT OUTREACH (OUTPATIENT)
Dept: FAMILY MEDICINE CLINIC | Facility: CLINIC | Age: 73
End: 2023-11-16

## 2023-11-16 ENCOUNTER — TELEPHONE (OUTPATIENT)
Dept: FAMILY MEDICINE CLINIC | Facility: CLINIC | Age: 73
End: 2023-11-16

## 2023-11-16 NOTE — PROGRESS NOTES
Patient is overdue for MA annual, mammogram, and colorectal screening. [facility] sent to patient. TE to front office to assist with scheduling appointment for annual physical and reminder of care gaps.

## 2023-11-16 NOTE — TELEPHONE ENCOUNTER
Patient is overdue for MA annual physical, mammogram and colorectal screening.  Routing to front office to assist with scheduling physical and reminder of care gaps.

## 2023-12-12 ENCOUNTER — PATIENT OUTREACH (OUTPATIENT)
Dept: FAMILY MEDICINE CLINIC | Facility: CLINIC | Age: 73
End: 2023-12-12

## 2023-12-12 NOTE — PROGRESS NOTES
Patient is overdue care gaps including annual physical, mammogram.  Left detailed message to call office back to schedule.

## 2024-02-13 ENCOUNTER — PATIENT OUTREACH (OUTPATIENT)
Dept: FAMILY MEDICINE CLINIC | Facility: CLINIC | Age: 74
End: 2024-02-13

## 2024-02-28 ENCOUNTER — PATIENT OUTREACH (OUTPATIENT)
Dept: FAMILY MEDICINE CLINIC | Facility: CLINIC | Age: 74
End: 2024-02-28

## 2024-02-28 NOTE — PROGRESS NOTES
Patient has not been seen in office since 7/2022.  Routing to front office to verify that patient is still under the care of Dr. Riley.  If so, annual physical is overdue and multiple care gaps.

## 2024-03-06 ENCOUNTER — PATIENT OUTREACH (OUTPATIENT)
Dept: FAMILY MEDICINE CLINIC | Facility: CLINIC | Age: 74
End: 2024-03-06

## 2024-03-06 NOTE — PROGRESS NOTES
Patient is overdue care gaps including annual physical, mammogram. Lvm for pt to call back and verify if she is still under  care and to be scheduled if so

## 2024-04-24 ENCOUNTER — PATIENT OUTREACH (OUTPATIENT)
Dept: FAMILY MEDICINE CLINIC | Facility: CLINIC | Age: 74
End: 2024-04-24

## 2024-04-24 NOTE — PROGRESS NOTES
Patient has not been seen since 2022.  Patient is overdue for annual physical and multiple care gaps.  Nagi message sent to patient.  Routing to front office to verify if patient is still under Dr. Riley's care.

## 2024-07-24 ENCOUNTER — TELEPHONE (OUTPATIENT)
Dept: FAMILY MEDICINE CLINIC | Facility: CLINIC | Age: 74
End: 2024-07-24

## 2024-07-24 NOTE — TELEPHONE ENCOUNTER
Called and left voicemail for patient to call back to make an appointment with    Patient has not been seen since 2021 , due for multiple care gaps

## 2024-08-27 ENCOUNTER — TELEPHONE (OUTPATIENT)
Dept: FAMILY MEDICINE CLINIC | Facility: CLINIC | Age: 74
End: 2024-08-27

## 2024-08-27 NOTE — TELEPHONE ENCOUNTER
Left voice mail informing the patient that she is due for a physical and b/p reading. Told patient to please call if would like to make an appointment or if Dr. Riley is no longer PCP.

## 2024-08-29 ENCOUNTER — TELEPHONE (OUTPATIENT)
Dept: FAMILY MEDICINE CLINIC | Facility: CLINIC | Age: 74
End: 2024-08-29

## 2024-08-29 NOTE — TELEPHONE ENCOUNTER
Patient has not been seen since 2022.  Routing to front office to assist with schedule and/or checking to see if patient is still under Dr. Riley's care.  MCM sent to patient as well.

## 2025-04-04 ENCOUNTER — PATIENT OUTREACH (OUTPATIENT)
Dept: FAMILY MEDICINE CLINIC | Facility: CLINIC | Age: 75
End: 2025-04-04

## 2025-04-04 NOTE — PROGRESS NOTES
Patient is overdue for multiple caregaps. Has not been seen since 2/9/2022. Patient has been contacted multiple times to schedule visits. Pcp removal has been started.

## 2025-04-18 ENCOUNTER — PATIENT OUTREACH (OUTPATIENT)
Dept: CASE MANAGEMENT | Age: 75
End: 2025-04-18

## 2025-04-18 NOTE — PROCEDURES
The office order for PCP removal request is Approved and finalized on April 18, 2025.    Removed ZEV WORLEY DO as the patient's Primary Care Physician

## (undated) NOTE — Clinical Note
Date: 2/21/2017    Patient Name: Jere Berg          To Whom it may concern: This letter has been written at the patient's request. The above patient was seen at the [facility] for treatment of a medical condition.     The patient may

## (undated) NOTE — LETTER
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   As you well remember, Car No new medical or surgical history.   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   Neck/Thyroid: no JVD, adenopathy or bruit, trachea midline  Lungs: clear, equal bilaterally, no wheezing  Cardiovascular: regular rate and rhythm without murmur  Skin: warm, dry and intact  Neurological:  Mental status: Normal.  Cranial nerves:Cra

## (undated) NOTE — Clinical Note
2/21/2017    Dear Kimberly Bueno,  I had the pleasure of seeing Hari Hazel today,2/21/2017   , for her first follow-up status post endovascular coiling of a ruptured right posterior communicating artery aneurysm performed on January 3, 2017.   As you well rem Review of patient's allergies indicates no known allergies.      Family History   Problem Relation Age of Onset   • Hypertension Mother    • Hypertension Father         Smoking status: Former Smoker 1.00 Packs/Day   Quit date: 07/01/2016   Smokeless tobacco her a letter giving her the ability to return to work full-time, with the one restriction of avoiding any lifting greater than 20 pounds. Her MRS is 0.    2.  With respect to the aneurysm, I have asked her to obtain a repeat MRA in 4–5 months.   If there is

## (undated) NOTE — ED AVS SNAPSHOT
[facility] Emergency Department    [facility]  [address] [address]    Phone:  [phone]    Fax:  [address]   MRN: [id number]    Department:  [facility] Emergency Department   Date of Visit:  2/7/2017 IF THERE IS ANY CHANGE OR WORSENING OF YOUR CONDITION, CALL YOUR PRIMARY CARE PHYSICIAN AT ONCE OR RETURN IMMEDIATELY TO THE EMERGENCY DEPARTMENT.     If you have been prescribed any medication(s), please fill your prescription right away and begin taking t

## (undated) NOTE — LETTER
8/10/2017      RE: Kori Dos Santos     : 1950    Dear Dr. Angie Garcia,    This letter is to inform you that your patient is being scheduled for surgery with Dr. Albertina Mazariegos  on 17 at [facility].    Diagnosis: history of intracranial aneurysm, stat

## (undated) NOTE — LETTER
[facility] [address], [address] [place] [address]    Consent for Anesthesia   1.    Geri Layne agree to be cared for by an anesthesiologist, who is specially trained to monitor me and give me medicine to put me to sleep or keep me comfortable vision, nerves, or muscles and in extremely rare instances death. 5. My doctor has explained to me other choices available to me for my care (alternatives).   6. Pregnant Patients (“epidural”):  I understand that the risks of having an epidural (medicine g

## (undated) NOTE — MR AVS SNAPSHOT
[address], [address] [place] [address] [id number]               Thank you for choosing us for your health care visit with Dolores Randolph MD.  We are glad to serve you and happy to provide you with this sum of individual in advance and they must present an ID as well. ? The name of the person picking up your prescription must be documented in your chart.   Scheduling Tests    If your physician has ordered radiology tests such as MRI or CT scans, do not schedu Visit [facility] online at  [email or web address]

## (undated) NOTE — LETTER
[facility] FOR BLOOD OR BLOOD COMPONENTS      In the course of your treatment, it may become necessary to administer a transfusion of blood or blood components.  This form provides basic information concerning this proc explain the alternatives to you if it has not already been done. I,Coleen Cortez, have read/had read to me the above. I understand the matters bearing on the decision whether or not to authorize a transfusion of blood or blood components.  I have no question

## (undated) NOTE — ED AVS SNAPSHOT
[facility] Emergency Department    [facility]  [address] [address]    Phone:  [phone]    Fax:  1956  [address]   MRN: [id number]    Department:  [facility] Emergency Department   Date of Visit:  2/7/2017 Insurance plans vary and the physician(s) referred by the ER may not be covered by your plan. Please contact your insurance company to determine coverage for follow-up care and referrals.     [facility]  Main [phone]  Pediatric If the emergency physician has read X-rays, these will be re-interpreted by a radiologist.  If there is a significant change in your reading, you will be contacted. Please make sure we have your correct phone number before you leave.  After you leave, you s and ask to get set up for an insurance coverage that is in-network with [facility]. [employer]     Sign up for [employer], your secure online medical record.   [employer] will allow you to access patient instructions from your recent visit,  view

## (undated) NOTE — LETTER
[facility] [address], [address]  Consent for Procedure/Sedation    Date:     Time:       1. I authorize the performance upon Betzy Villalobos the following: CEREBRAL ANGIOGRAM WITH FLOW DIVERTING STENT,POSSIBLE COILING    2.  I Lupe Backers Signature of person authorized to consent for patient:        Relationship to patient:    ________________________________    ___________________    Witness: _________________________      Date: ___________________    Printed: 9/21/2017   12:18 PM  Patient

## (undated) NOTE — LETTER
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   As you well remember, Chaitanya Rios Mouth & throat: no symptoms reported  Neck: no symptoms reported  Nose: no symptoms reported  Psychiatric: no symptoms reported    UPDATED MEDICAL/SURGICAL HISTORY     See above.   Past Medical History:   Diagnosis Date   • Bowel obstruction 2017   • Brain is alert and oriented ×3. Her speech is clear and fluent, and her recent and remote memory are intact.   HEENT: Head normal. Ears normal. Eyes normal. Nose normal. Throat normal.  Neck/Thyroid: no JVD, adenopathy or bruit, trachea midline  Lungs: clear, eq

## (undated) NOTE — IP AVS SNAPSHOT
[facility] [place] [address] [place], [address] ~ [phone]                Discharge Summary   1/3/2017    Oralia Ridley           Admission Information        Provider Department    1/3/2017 Jerilee Lefort, MD  [id number]         Than Future Appointments  Date Time Provider Garfield Maci   1/30/2017 2:00 PM Juan Cordoba MD [facility] AT [facility]     No strenuous activities until cleared by Dr. Vikas Martinez.   May continue normal activities such as walking, light household chores, etc. Jose De Jesus Galvez Your physician or the clinic staff will work with your insurance company to obtain this authorization for your ordered radiology test.    To schedule an appointment for your radiology test please call [facility] Scheduling   at [phone]. 32.4 (01/13/17)  33.5  (01/13/17)  264.0     (01/11/17)  10.7 (01/11/17)  3.77 (L) (01/11/17)  12.2 (01/11/17)  35.5 (01/11/17)  94.2 (01/11/17)  32.4 (01/11/17)  34.4  (01/11/17)  229.0       Recent Hematology Lab Results (cont.)  (Last 3 results in the p harming yourself, contact [facility] at [phone]. - If you don’t have insurance, Shaun Coley has partnered with Patient [address] to help you get signed up for insurance coverage.   Patient [place] your medications while at home.          Blood Pressure and Cardiac Medications     nimodipine 30 MG Oral Cap         Use: Treat abnormal blood pressure (high or low), cardiac conditions; and/or abnormal heart rates/rhythms   Most common side effects: Dizzi

## (undated) NOTE — LETTER
Date: 10/23/2017    Patient Name: Renaldo Roy  : 1950    To: [address] [facility]    This letter has been written at the patient's request. Renaldo Roy is a current patient under the care of a provider at the [facility].